# Patient Record
Sex: MALE | Race: BLACK OR AFRICAN AMERICAN | Employment: UNEMPLOYED | URBAN - METROPOLITAN AREA
[De-identification: names, ages, dates, MRNs, and addresses within clinical notes are randomized per-mention and may not be internally consistent; named-entity substitution may affect disease eponyms.]

---

## 2020-08-13 ENCOUNTER — VIRTUAL VISIT (OUTPATIENT)
Dept: INTERNAL MEDICINE CLINIC | Age: 60
End: 2020-08-13
Payer: MEDICAID

## 2020-08-13 DIAGNOSIS — R21 SKIN RASH: Primary | ICD-10-CM

## 2020-08-13 DIAGNOSIS — Z11.3 SCREEN FOR STD (SEXUALLY TRANSMITTED DISEASE): ICD-10-CM

## 2020-08-13 PROCEDURE — 99213 OFFICE O/P EST LOW 20 MIN: CPT | Performed by: INTERNAL MEDICINE

## 2020-08-13 NOTE — PROGRESS NOTES
Chief Complaint   Patient presents with    Rash     really wanted a physical     1. Have you been to the ER, urgent care clinic since your last visit? Hospitalized since your last visit? No    2. Have you seen or consulted any other health care providers outside of the [address] [place] [address] since your last visit? Include any pap smears or colon screening.  No

## 2020-08-13 NOTE — PROGRESS NOTES
Chrissie Glass is a 63 y.o. male who was seen by synchronous (real-time) audio-video technology on 8/13/2020 for Rash (really wanted a physical)        Assessment & Plan:   1. Skin rash  Resolving w/o intervention  - METABOLIC PANEL, COMPREHENSIVE  - LIPID PANEL  - PSA, DIAGNOSTIC (PROSTATE SPECIFIC AG)  - CBC W/O DIFF    2. Screen for STD (sexually transmitted disease)    - HEPATITIS C QT BY PCR WITH REFLEX GENOTYPE  - HIV 1/2 AG/AB, 4TH GENERATION,W RFLX CONFIRM  - CHLAMYDIA/GC PCR  - T VAGINALIS AMPLIFICATION  - T PALLIDUM SCREEN W/REFLEX    Pt will schedule an appt for IN OFFICE establish care visit    712  Subjective:     First visit. Pt w c/o 1 week non-itchy rash on chest and jenna arms. ?Insect bites. Pt relays the rash is now resolved (almost) and is no longer bothersome. Pt also suffered from abd cramps and loose BMs after eating out  About the same time, but has recovered from that as well w nml BMs. Pt denies fever/URI sxs/blood in stool/intractable vomiting. Prior to Admission medications    Not on File     Review of systems (12) negative, except noted above. Objective:     Patient-Reported Vitals 8/13/2020   Patient-Reported Weight 193lbs   Patient-Reported Height 5f11      General: alert, cooperative, no distress   Mental  status: normal mood, behavior, speech, dress, motor activity, and thought processes, able to follow commands   HENT: NCAT   Neck: no visualized mass   Resp: no respiratory distress   Neuro: no gross deficits   Skin: no discoloration or lesions of concern on visible areas. Unable to visualize rash due to poor camera quality   Psychiatric: normal affect, consistent with stated mood, no evidence of hallucinations           We discussed the expected course, resolution and complications of the diagnosis(es) in detail. Medication risks, benefits, costs, interactions, and alternatives were discussed as indicated.   I advised him to contact the office if his condition worsens, changes or fails to improve as anticipated. He expressed understanding with the diagnosis(es) and plan. Bronwyn Goldstein, who was evaluated through a patient-initiated, synchronous (real-time) audio-video encounter, and/or his healthcare decision maker, is aware that it is a billable service, with coverage as determined by his insurance carrier. He provided verbal consent to proceed: Yes, and patient identification was verified. It was conducted pursuant to the emergency declaration under the [address] [place] [address], [address] [place] [address] authority and the [email or web address] and [employer] General Act. A caregiver was present when appropriate. Ability to conduct physical exam was limited. I was at home. The patient was at home.       Nichol Catherine MD

## 2020-08-19 LAB
ALBUMIN SERPL-MCNC: 3.6 G/DL (ref 3.8–4.8)
ALBUMIN/GLOB SERPL: 0.9 {RATIO} (ref 1.2–2.2)
ALP SERPL-CCNC: 146 IU/L (ref 39–117)
ALT SERPL-CCNC: 22 IU/L (ref 0–44)
AST SERPL-CCNC: 17 IU/L (ref 0–40)
BILIRUB SERPL-MCNC: 0.7 MG/DL (ref 0–1.2)
BUN SERPL-MCNC: 15 MG/DL (ref 8–27)
BUN/CREAT SERPL: 16 (ref 10–24)
C TRACH RRNA SPEC QL NAA+PROBE: NEGATIVE
CALCIUM SERPL-MCNC: 9.4 MG/DL (ref 8.6–10.2)
CHLORIDE SERPL-SCNC: 103 MMOL/L (ref 96–106)
CHOLEST SERPL-MCNC: 170 MG/DL (ref 100–199)
CO2 SERPL-SCNC: 26 MMOL/L (ref 20–29)
CREAT SERPL-MCNC: 0.91 MG/DL (ref 0.76–1.27)
ERYTHROCYTE [DISTWIDTH] IN BLOOD BY AUTOMATED COUNT: 15.5 % (ref 11.6–15.4)
GLOBULIN SER CALC-MCNC: 4.2 G/DL (ref 1.5–4.5)
GLUCOSE SERPL-MCNC: 71 MG/DL (ref 65–99)
HCT VFR BLD AUTO: 39.3 % (ref 37.5–51)
HCV GENOTYPE: NORMAL
HCV RNA SERPL NAA+PROBE-ACNC: NORMAL IU/ML
HCV RNA SERPL NAA+PROBE-LOG IU: NORMAL LOG10 IU/ML
HDLC SERPL-MCNC: 33 MG/DL
HGB BLD-MCNC: 12.4 G/DL (ref 13–17.7)
HIV 1+2 AB+HIV1 P24 AG SERPL QL IA: NON REACTIVE
INTERPRETATION, 910389: NORMAL
LDLC SERPL CALC-MCNC: 112 MG/DL (ref 0–99)
MCH RBC QN AUTO: 28.6 PG (ref 26.6–33)
MCHC RBC AUTO-ENTMCNC: 31.6 G/DL (ref 31.5–35.7)
MCV RBC AUTO: 91 FL (ref 79–97)
N GONORRHOEA RRNA SPEC QL NAA+PROBE: NEGATIVE
PLATELET # BLD AUTO: 427 X10E3/UL (ref 150–450)
POTASSIUM SERPL-SCNC: 4.7 MMOL/L (ref 3.5–5.2)
PROT SERPL-MCNC: 7.8 G/DL (ref 6–8.5)
PSA SERPL-MCNC: 1.8 NG/ML (ref 0–4)
RBC # BLD AUTO: 4.34 X10E6/UL (ref 4.14–5.8)
RPR SER QL: REACTIVE
RPR SER-TITR: ABNORMAL {TITER}
SODIUM SERPL-SCNC: 143 MMOL/L (ref 134–144)
T VAGINALIS DNA SPEC QL NAA+PROBE: NEGATIVE
TEST INFORMATION: NORMAL
TREPONEMA PALLIDUM IGG+IGM AB [PRESENCE] IN SERUM OR PLASMA BY IMMUNOASSAY: REACTIVE
TRIGL SERPL-MCNC: 127 MG/DL (ref 0–149)
VLDLC SERPL CALC-MCNC: 25 MG/DL (ref 5–40)
WBC # BLD AUTO: 7.8 X10E3/UL (ref 3.4–10.8)

## 2020-08-20 ENCOUNTER — TELEPHONE (OUTPATIENT)
Dept: INTERNAL MEDICINE CLINIC | Age: 60
End: 2020-08-20

## 2020-08-20 PROBLEM — A53.0 POSITIVE RPR TEST: Status: ACTIVE | Noted: 2020-08-20

## 2020-08-20 NOTE — TELEPHONE ENCOUNTER
Called and d/w pt + syphilis test. [facility] number provided for tx and counseling re:partner notification. D/w pt NO UNPROTECTED sex.

## 2020-10-07 ENCOUNTER — OFFICE VISIT (OUTPATIENT)
Dept: INTERNAL MEDICINE CLINIC | Age: 60
End: 2020-10-07
Payer: MEDICAID

## 2020-10-07 VITALS
SYSTOLIC BLOOD PRESSURE: 138 MMHG | WEIGHT: 194.8 LBS | OXYGEN SATURATION: 96 % | HEIGHT: 71 IN | RESPIRATION RATE: 19 BRPM | HEART RATE: 88 BPM | BODY MASS INDEX: 27.27 KG/M2 | DIASTOLIC BLOOD PRESSURE: 91 MMHG | TEMPERATURE: 98.4 F

## 2020-10-07 DIAGNOSIS — I10 ESSENTIAL HYPERTENSION: Primary | ICD-10-CM

## 2020-10-07 DIAGNOSIS — E78.5 HYPERLIPIDEMIA, UNSPECIFIED HYPERLIPIDEMIA TYPE: ICD-10-CM

## 2020-10-07 DIAGNOSIS — G89.29 CHRONIC LEFT-SIDED LOW BACK PAIN WITH LEFT-SIDED SCIATICA: ICD-10-CM

## 2020-10-07 DIAGNOSIS — Z23 ENCOUNTER FOR IMMUNIZATION: ICD-10-CM

## 2020-10-07 DIAGNOSIS — M54.42 CHRONIC LEFT-SIDED LOW BACK PAIN WITH LEFT-SIDED SCIATICA: ICD-10-CM

## 2020-10-07 PROCEDURE — 90715 TDAP VACCINE 7 YRS/> IM: CPT

## 2020-10-07 PROCEDURE — 99214 OFFICE O/P EST MOD 30 MIN: CPT | Performed by: INTERNAL MEDICINE

## 2020-10-07 RX ORDER — ATORVASTATIN CALCIUM 10 MG/1
10 TABLET, FILM COATED ORAL DAILY
COMMUNITY
End: 2021-09-21 | Stop reason: SDUPTHER

## 2020-10-07 RX ORDER — LISINOPRIL 10 MG/1
10 TABLET ORAL DAILY
Qty: 90 TAB | Refills: 1 | Status: SHIPPED | OUTPATIENT
Start: 2020-10-07 | End: 2021-09-21 | Stop reason: SDUPTHER

## 2020-10-07 RX ORDER — LISINOPRIL 5 MG/1
5 TABLET ORAL DAILY
COMMUNITY
End: 2020-10-07 | Stop reason: SDUPTHER

## 2020-10-07 NOTE — PROGRESS NOTES
Kasia Hernandez is a 63 y.o. male and presents with Hypertension  . Subjective:  Pt comes-in for PE    PMH- HTN- on lisinopril 5 mg     BP Readings from Last 3 Encounters:   10/07/20 (!) 138/91      Hyperlipidemia- on atorvastatin 10mg  Lab Results   Component Value Date/Time    Cholesterol, total 170 2020 08:57 AM    HDL Cholesterol 33 (L) 2020 08:57 AM    LDL, calculated 112 (H) 2020 08:57 AM    VLDL, calculated 25 2020 08:57 AM    Triglyceride 127 2020 08:57 AM     Lab Results   Component Value Date/Time    ALT (SGPT) 22 2020 08:57 AM    AST (SGOT) 17 2020 08:57 AM    Alk. phosphatase 146 (H) 2020 08:57 AM    Bilirubin, total 0.7 2020 08:57 AM      Chronic lbp/OA      PSH-none    SH- single   Self employed   No tob, occas alcohol, no illicit drugs   Lives alone no children    FH- father  ? ? Mother  CAD, HTN   1 brother HTN    HM    Colonoscopy ~ 5 yrs ago in [facility] as per pt  Immunizations declines flu vaccine  Eye care ~6 mths  Dental care ~ 1 mth ago          Review of Systems  Constitutional: negative for fevers, chills, anorexia and weight loss  Eyes:   negative for visual disturbance and irritation  ENT:   negative for tinnitus,sore throat,nasal congestion,ear pains. hoarseness  Respiratory:  negative for cough, hemoptysis, dyspnea,wheezing  CV:   negative for chest pain, palpitations, lower extremity edema  GI:   negative for nausea, vomiting, diarrhea, abdominal pain,melena  Endo:               negative for polyuria,polydipsia,polyphagia,heat intolerance  Musculoskel: positive for  arthralgias, back pain,  joint pain  Neurological:  negative for headaches, dizziness, vertigo, memory problems and gait   Behavl/Psych: negative for feelings of anxiety, depression, mood changes    Past Medical History:   Diagnosis Date    Hypercholesterolemia     Hypertension      History reviewed. No pertinent surgical history.   Social History Socioeconomic History    Marital status: UNKNOWN     Spouse name: Not on file    Number of children: Not on file    Years of education: Not on file    Highest education level: Not on file   Tobacco Use    Smoking status: Former Smoker     Packs/day: 0.25     Years: 2.00     Pack years: 0.50     Last attempt to quit: 2000     Years since quittin.1    Smokeless tobacco: Never Used   Substance and Sexual Activity    Alcohol use: Yes     Comment: social drinker    Drug use: Never    Sexual activity: Yes     Partners: Female     Birth control/protection: Condom     History reviewed. No pertinent family history. Current Outpatient Medications   Medication Sig Dispense Refill    atorvastatin (LIPITOR) 10 mg tablet Take 10 mg by mouth daily.  lisinopriL (PRINIVIL, ZESTRIL) 10 mg tablet Take 1 Tab by mouth daily. 90 Tab 1     No Known Allergies    Objective:  Visit Vitals  BP (!) 138/91 (BP 1 Location: Left arm, BP Patient Position: Sitting)   Pulse 88   Temp 98.4 °F (36.9 °C) (Temporal)   Resp 19   Ht 5' 11\" (1.803 m)   Wt 194 lb 12.8 oz (88.4 kg)   SpO2 96%   BMI 27.17 kg/m²     Physical Exam:   General appearance - alert, well appearing, and in no distress. Pleasant  Mental status - alert, oriented to person, place, and time  EYE-EOMI  Neck - supple, no significant adenopathy   Chest - clear to auscultation, no wheezes, rales or rhonchi, symmetric air entry   Heart - normal rate, regular rhythm, normal S1, S2, no murmurs, rubs, clicks or gallops   Abdomen - soft, nontender, nondistended, no masses or organomegaly + small, reducible umbil hernia  Ext-peripheral pulses normal, no pedal edema, no clubbing or cyanosis  Skin-Warm and dry.  no hyperpigmentation, vitiligo, or suspicious lesions  Neuro -alert, oriented, normal speech, no focal findings or movement disorder noted      Results for orders placed or performed in visit on 20   CHLAMYDIA/GC PCR   Result Value Ref Range    Chlamydia trachomatis, SAMANTHA Negative Negative    Neisseria gonorrhoeae, SAMANTHA Negative Negative   METABOLIC PANEL, COMPREHENSIVE   Result Value Ref Range    Glucose 71 65 - 99 mg/dL    BUN 15 8 - 27 mg/dL    Creatinine 0.91 0.76 - 1.27 mg/dL    GFR est non-AA 91 >59 mL/min/1.73    GFR est  >59 mL/min/1.73    BUN/Creatinine ratio 16 10 - 24    Sodium 143 134 - 144 mmol/L    Potassium 4.7 3.5 - 5.2 mmol/L    Chloride 103 96 - 106 mmol/L    CO2 26 20 - 29 mmol/L    Calcium 9.4 8.6 - 10.2 mg/dL    Protein, total 7.8 6.0 - 8.5 g/dL    Albumin 3.6 (L) 3.8 - 4.8 g/dL    GLOBULIN, TOTAL 4.2 1.5 - 4.5 g/dL    A-G Ratio 0.9 (L) 1.2 - 2.2    Bilirubin, total 0.7 0.0 - 1.2 mg/dL    Alk.  phosphatase 146 (H) 39 - 117 IU/L    AST (SGOT) 17 0 - 40 IU/L    ALT (SGPT) 22 0 - 44 IU/L   LIPID PANEL   Result Value Ref Range    Cholesterol, total 170 100 - 199 mg/dL    Triglyceride 127 0 - 149 mg/dL    HDL Cholesterol 33 (L) >39 mg/dL    VLDL, calculated 25 5 - 40 mg/dL    LDL, calculated 112 (H) 0 - 99 mg/dL   PSA, DIAGNOSTIC (PROSTATE SPECIFIC AG)   Result Value Ref Range    Prostate Specific Ag 1.8 0.0 - 4.0 ng/mL   CBC W/O DIFF   Result Value Ref Range    WBC 7.8 3.4 - 10.8 x10E3/uL    RBC 4.34 4.14 - 5.80 x10E6/uL    HGB 12.4 (L) 13.0 - 17.7 g/dL    HCT 39.3 37.5 - 51.0 %    MCV 91 79 - 97 fL    MCH 28.6 26.6 - 33.0 pg    MCHC 31.6 31.5 - 35.7 g/dL    RDW 15.5 (H) 11.6 - 15.4 %    PLATELET 507 674 - 230 x10E3/uL   HEPATITIS C QT BY PCR WITH REFLEX GENOTYPE   Result Value Ref Range    Hepatitis C Quantitation HCV Not Detected IU/mL    HCV log10 CANCELED log10 IU/mL    TEST INFORMATION Comment     HCV Genotype CANCELED    HIV 1/2 AG/AB, 4TH GENERATION,W RFLX CONFIRM   Result Value Ref Range    HIV SCREEN 4TH GENERATION WRFX Non Reactive Non Reactive   T VAGINALIS AMPLIFICATION   Result Value Ref Range    T. vaginalis by SAAMNTHA Negative Negative   T PALLIDUM SCREEN W/REFLEX   Result Value Ref Range    T PALLIDUM AB Reactive (A) Non Reactive RPR   Result Value Ref Range    RPR Reactive (A) Non Reactive   RPR, QUANT   Result Value Ref Range    RPR, QUANT 1:128 (H) NonRea<1:1   CVD REPORT   Result Value Ref Range    INTERPRETATION Note        Assessment/Plan:    ICD-10-CM ICD-9-CM    1. Essential hypertension  I10 401.9 lisinopriL (PRINIVIL, ZESTRIL) 10 mg tablet   2. Hyperlipidemia, unspecified hyperlipidemia type  E78.5 272.4    3. Chronic left-sided low back pain with left-sided sciatica  M54.42 724.2 XR SPINE LUMB 2 OR 3 V    G89.29 724.3 XR HIP RT W OR WO PELV 2-3 VWS     338.29 XR HIP LT W OR WO PELV 2-3 VWS   4. Encounter for immunization  Z23 V03.89 TETANUS, DIPHTHERIA TOXOIDS AND ACELLULAR PERTUSSIS VACCINE (TDAP), IN INDIVIDS. >=7, IM     Orders Placed This Encounter    XR SPINE LUMB 2 OR 3 V     Standing Status:   Future     Standing Expiration Date:   1/7/2021    XR HIP RT W OR WO PELV 2-3 VWS     Standing Status:   Future     Standing Expiration Date:   1/7/2021    XR HIP LT W OR WO PELV 2-3 VWS     Standing Status:   Future     Standing Expiration Date:   1/7/2021    TETANUS, DIPHTHERIA TOXOIDS AND ACELLULAR PERTUSSIS VACCINE (TDAP), IN INDIVIDS. >=7, IM    atorvastatin (LIPITOR) 10 mg tablet     Sig: Take 10 mg by mouth daily.  DISCONTD: lisinopriL (PRINIVIL, ZESTRIL) 5 mg tablet     Sig: Take 5 mg by mouth daily.  lisinopriL (PRINIVIL, ZESTRIL) 10 mg tablet     Sig: Take 1 Tab by mouth daily. Dispense:  90 Tab     Refill:  1     HIGHER DOSE     1. Essential hypertension  Increase lisinopril 10mg daily  - lisinopriL (PRINIVIL, ZESTRIL) 10 mg tablet; Take 1 Tab by mouth daily. Dispense: 90 Tab; Refill: 1    2. Hyperlipidemia, unspecified hyperlipidemia type  Cont statin    3. Chronic left-sided low back pain with left-sided sciatica    - XR SPINE LUMB 2 OR 3 V; Future  - XR HIP RT W OR WO PELV 2-3 VWS; Future  - XR HIP LT W OR WO PELV 2-3 VWS; Future    4.  Encounter for immunization  Administered  - TETANUS, DIPHTHERIA TOXOIDS AND ACELLULAR PERTUSSIS VACCINE (TDAP), IN INDIVIDS. >=7, IM    There are no Patient Instructions on file for this visit. I have reviewed with the patient details of the assessment and plan and all questions were answered. Relevent patient education was performed. The most recent lab findings were reviewed with the patient. An After Visit Summary was printed and given to the patient.

## 2020-10-07 NOTE — PROGRESS NOTES
Chief Complaint   Patient presents with    Hypertension     1. Have you been to the ER, urgent care clinic since your last visit? Hospitalized since your last visit? No    2. Have you seen or consulted any other health care providers outside of the [address] [place] [address] since your last visit? Include any pap smears or colon screening. No     After obtaining consent, and per orders of Dr. Franklin Kelly, injection of TDAPgiven by Sourav Campos LPN. Patient instructed to remain in clinic for 15 minutes afterwards, and to report any adverse reaction to me immediately.

## 2021-09-21 ENCOUNTER — OFFICE VISIT (OUTPATIENT)
Dept: INTERNAL MEDICINE CLINIC | Age: 61
End: 2021-09-21
Payer: MEDICAID

## 2021-09-21 ENCOUNTER — HOSPITAL ENCOUNTER (OUTPATIENT)
Dept: GENERAL RADIOLOGY | Age: 61
Discharge: HOME | End: 2021-09-21
Payer: MEDICAID

## 2021-09-21 VITALS
OXYGEN SATURATION: 96 % | TEMPERATURE: 98.2 F | WEIGHT: 198 LBS | HEART RATE: 88 BPM | DIASTOLIC BLOOD PRESSURE: 89 MMHG | HEIGHT: 71 IN | RESPIRATION RATE: 19 BRPM | SYSTOLIC BLOOD PRESSURE: 137 MMHG | BODY MASS INDEX: 27.72 KG/M2

## 2021-09-21 DIAGNOSIS — R09.89 BIBASILAR CRACKLES: ICD-10-CM

## 2021-09-21 DIAGNOSIS — E78.5 HYPERLIPIDEMIA, UNSPECIFIED HYPERLIPIDEMIA TYPE: ICD-10-CM

## 2021-09-21 DIAGNOSIS — Z11.3 SCREEN FOR STD (SEXUALLY TRANSMITTED DISEASE): ICD-10-CM

## 2021-09-21 DIAGNOSIS — I10 ESSENTIAL HYPERTENSION: Primary | ICD-10-CM

## 2021-09-21 PROCEDURE — 99214 OFFICE O/P EST MOD 30 MIN: CPT | Performed by: INTERNAL MEDICINE

## 2021-09-21 PROCEDURE — 71046 X-RAY EXAM CHEST 2 VIEWS: CPT

## 2021-09-21 RX ORDER — LISINOPRIL 10 MG/1
10 TABLET ORAL DAILY
Qty: 90 TABLET | Refills: 1 | Status: SHIPPED | OUTPATIENT
Start: 2021-09-21 | End: 2022-03-29 | Stop reason: SDUPTHER

## 2021-09-21 RX ORDER — ATORVASTATIN CALCIUM 10 MG/1
10 TABLET, FILM COATED ORAL DAILY
Qty: 90 TABLET | Refills: 1 | Status: SHIPPED | OUTPATIENT
Start: 2021-09-21 | End: 2022-03-29 | Stop reason: SDUPTHER

## 2021-09-21 NOTE — PROGRESS NOTES
Chief Complaint   Patient presents with    Blood Pressure Check     1. Have you been to the ER, urgent care clinic since your last visit? Hospitalized since your last visit? No    2. Have you seen or consulted any other health care providers outside of the [address] [place] [address] since your last visit? Include any pap smears or colon screening.  No

## 2021-09-21 NOTE — PROGRESS NOTES
Alexis Pearson is a 57 y.o. male and presents with Blood Pressure Check  . Subjective:  Pts last visit 10/020    PMH- HTN- on lisinopril 5 mg     BP Readings from Last 3 Encounters:   21 137/89   10/07/20 (!) 138/91      Hyperlipidemia- on atorvastatin 10mg  Lab Results   Component Value Date/Time    Cholesterol, total 170 2020 08:57 AM    HDL Cholesterol 33 (L) 2020 08:57 AM    LDL, calculated 112 (H) 2020 08:57 AM    VLDL, calculated 25 2020 08:57 AM    Triglyceride 127 2020 08:57 AM     Lab Results   Component Value Date/Time    ALT (SGPT) 22 2020 08:57 AM    AST (SGOT) 17 2020 08:57 AM    Alk. phosphatase 146 (H) 2020 08:57 AM    Bilirubin, total 0.7 2020 08:57 AM      Chronic lbp/OA      PSH-none    SH- single   Self employed   No tob, occas alcohol, no illicit drugs   Lives alone no children    FH- father  ? ? Mother  CAD, HTN   1 brother HTN    HM    Colonoscopy ~ 5 yrs ago in [facility] as per pt  Immunizations declines flu vaccine  Eye care ~6 mths  Dental care ~ 1 mth ago          Review of Systems  Constitutional: negative for fevers, chills, anorexia and weight loss  Eyes:   negative for visual disturbance and irritation  ENT:   negative for tinnitus,sore throat,nasal congestion,ear pains. hoarseness  Respiratory:  negative for cough, hemoptysis, dyspnea,wheezing  CV:   negative for chest pain, palpitations, lower extremity edema  GI:   negative for nausea, vomiting, diarrhea, abdominal pain,melena  Endo:               negative for polyuria,polydipsia,polyphagia,heat intolerance  Musculoskel: positive for  arthralgias, back pain,  joint pain  Neurological:  negative for headaches, dizziness, vertigo, memory problems and gait   Behavl/Psych: negative for feelings of anxiety, depression, mood changes    Past Medical History:   Diagnosis Date    Hypercholesterolemia     Hypertension      History reviewed.  No pertinent surgical history. Social History     Socioeconomic History    Marital status: UNKNOWN     Spouse name: Not on file    Number of children: Not on file    Years of education: Not on file    Highest education level: Not on file   Tobacco Use    Smoking status: Former Smoker     Packs/day: 0.25     Years: 2.00     Pack years: 0.50     Quit date: 2000     Years since quittin.1    Smokeless tobacco: Never Used   Substance and Sexual Activity    Alcohol use: Yes     Comment: social drinker    Drug use: Never    Sexual activity: Yes     Partners: Female     Birth control/protection: Condom     Social Determinants of Health     Financial Resource Strain:     Difficulty of Paying Living Expenses:    Food Insecurity:     Worried About Running Out of Food in the Last Year:     [address] in the Last Year:    Transportation Needs:     Lack of Transportation (Medical):  Lack of Transportation (Non-Medical):    Physical Activity:     Days of Exercise per Week:     Minutes of Exercise per Session:    Stress:     Feeling of Stress :    Social Connections:     Frequency of Communication with Friends and Family:     Frequency of Social Gatherings with Friends and Family:     Attends Rastafari Services:     Active Member of Clubs or Organizations:     Attends Club or Organization Meetings:     Marital Status:      No family history on file. Current Outpatient Medications   Medication Sig Dispense Refill    lisinopriL (PRINIVIL, ZESTRIL) 10 mg tablet Take 1 Tablet by mouth daily. 90 Tablet 1    atorvastatin (LIPITOR) 10 mg tablet Take 1 Tablet by mouth daily.  90 Tablet 1     No Known Allergies    Objective:  Visit Vitals  /89 (BP 1 Location: Left upper arm, BP Patient Position: Sitting, BP Cuff Size: Adult)   Pulse 88   Temp 98.2 °F (36.8 °C) (Temporal)   Resp 19   Ht 5' 11\" (1.803 m)   Wt 198 lb (89.8 kg)   SpO2 96%   BMI 27.62 kg/m²     Physical Exam:   General appearance - alert, well appearing, and in no distress. Pleasant  Mental status - alert, oriented to person, place, and time  EYE-EOMI  Neck - supple, no significant adenopathy   Chest - clear to auscultation, no wheezes, rales or rhonchi, symmetric air entry +bibasilar rales Does NOT clear w cough  Heart - normal rate, regular rhythm, normal S1, S2, no murmurs, rubs, clicks or gallops   Abdomen - soft, nontender, nondistended, no masses or organomegaly + small, reducible umbil hernia  Ext-peripheral pulses normal, no pedal edema, no clubbing or cyanosis  Skin-Warm and dry. no hyperpigmentation, vitiligo, or suspicious lesions  Neuro -alert, oriented, normal speech, no focal findings or movement disorder noted      Results for orders placed or performed in visit on 08/13/20   CHLAMYDIA/GC PCR   Result Value Ref Range    Chlamydia trachomatis, SAMANTHA Negative Negative    Neisseria gonorrhoeae, SAMANTHA Negative Negative   METABOLIC PANEL, COMPREHENSIVE   Result Value Ref Range    Glucose 71 65 - 99 mg/dL    BUN 15 8 - 27 mg/dL    Creatinine 0.91 0.76 - 1.27 mg/dL    GFR est non-AA 91 >59 mL/min/1.73    GFR est  >59 mL/min/1.73    BUN/Creatinine ratio 16 10 - 24    Sodium 143 134 - 144 mmol/L    Potassium 4.7 3.5 - 5.2 mmol/L    Chloride 103 96 - 106 mmol/L    CO2 26 20 - 29 mmol/L    Calcium 9.4 8.6 - 10.2 mg/dL    Protein, total 7.8 6.0 - 8.5 g/dL    Albumin 3.6 (L) 3.8 - 4.8 g/dL    GLOBULIN, TOTAL 4.2 1.5 - 4.5 g/dL    A-G Ratio 0.9 (L) 1.2 - 2.2    Bilirubin, total 0.7 0.0 - 1.2 mg/dL    Alk.  phosphatase 146 (H) 39 - 117 IU/L    AST (SGOT) 17 0 - 40 IU/L    ALT (SGPT) 22 0 - 44 IU/L   LIPID PANEL   Result Value Ref Range    Cholesterol, total 170 100 - 199 mg/dL    Triglyceride 127 0 - 149 mg/dL    HDL Cholesterol 33 (L) >39 mg/dL    VLDL, calculated 25 5 - 40 mg/dL    LDL, calculated 112 (H) 0 - 99 mg/dL   PSA, DIAGNOSTIC (PROSTATE SPECIFIC AG)   Result Value Ref Range    Prostate Specific Ag 1.8 0.0 - 4.0 ng/mL   CBC W/O DIFF   Result Value Ref Range    WBC 7.8 3.4 - 10.8 x10E3/uL    RBC 4.34 4.14 - 5.80 x10E6/uL    HGB 12.4 (L) 13.0 - 17.7 g/dL    HCT 39.3 37.5 - 51.0 %    MCV 91 79 - 97 fL    MCH 28.6 26.6 - 33.0 pg    MCHC 31.6 31.5 - 35.7 g/dL    RDW 15.5 (H) 11.6 - 15.4 %    PLATELET 071 654 - 162 x10E3/uL   HEPATITIS C QT BY PCR WITH REFLEX GENOTYPE   Result Value Ref Range    Hepatitis C Quantitation HCV Not Detected IU/mL    HCV log10 CANCELED log10 IU/mL    Test information Comment     HCV Genotype CANCELED    HIV 1/2 AG/AB, 4TH GENERATION,W RFLX CONFIRM   Result Value Ref Range    HIV SCREEN 4TH GENERATION WRFX Non Reactive Non Reactive   T VAGINALIS AMPLIFICATION   Result Value Ref Range    T. vaginalis by SAMANTHA Negative Negative   T PALLIDUM SCREEN W/REFLEX   Result Value Ref Range    T PALLIDUM AB Reactive (A) Non Reactive   RPR   Result Value Ref Range    RPR Reactive (A) Non Reactive   RPR, QUANT   Result Value Ref Range    RPR, QUANT 1:128 (H) NonRea<1:1   CVD REPORT   Result Value Ref Range    INTERPRETATION Note        Assessment/Plan:    ICD-10-CM ICD-9-CM    1. Essential hypertension  I10 401.9 lisinopriL (PRINIVIL, ZESTRIL) 10 mg tablet      PSA, DIAGNOSTIC (PROSTATE SPECIFIC AG)      LIPID PANEL      CBC WITH AUTOMATED DIFF      METABOLIC PANEL, COMPREHENSIVE      CANCELED: PSA, DIAGNOSTIC (PROSTATE SPECIFIC AG)      CANCELED: LIPID PANEL      CANCELED: CBC WITH AUTOMATED DIFF      CANCELED: METABOLIC PANEL, COMPREHENSIVE      CANCELED: METABOLIC PANEL, COMPREHENSIVE      CANCELED: CBC WITH AUTOMATED DIFF      CANCELED: LIPID PANEL      CANCELED: PSA, DIAGNOSTIC (PROSTATE SPECIFIC AG)   2.  Hyperlipidemia, unspecified hyperlipidemia type  E78.5 272.4 atorvastatin (LIPITOR) 10 mg tablet      PSA, DIAGNOSTIC (PROSTATE SPECIFIC AG)      LIPID PANEL      CBC WITH AUTOMATED DIFF      METABOLIC PANEL, COMPREHENSIVE      CANCELED: PSA, DIAGNOSTIC (PROSTATE SPECIFIC AG)      CANCELED: LIPID PANEL      CANCELED: CBC WITH AUTOMATED DIFF CANCELED: METABOLIC PANEL, COMPREHENSIVE      CANCELED: METABOLIC PANEL, COMPREHENSIVE      CANCELED: CBC WITH AUTOMATED DIFF      CANCELED: LIPID PANEL      CANCELED: PSA, DIAGNOSTIC (PROSTATE SPECIFIC AG)   3. Screen for STD (sexually transmitted disease)  Z11.3 V74.5 HIV 1/2 AG/AB, 4TH GENERATION,W RFLX CONFIRM      T VAGINALIS AMPLIFICATION      CHLAMYDIA / GC-AMPLIFIED      HEPATITIS C AB, RFLX TO QT BY PCR      CANCELED: HIV 1/2 AG/AB, 4TH GENERATION,W RFLX CONFIRM      CANCELED: T VAGINALIS AMPLIFICATION      CANCELED: CHLAMYDIA / GC-AMPLIFIED      CANCELED: HEPATITIS C AB, RFLX TO QT BY PCR      CANCELED: HEPATITIS C AB, RFLX TO QT BY PCR      CANCELED: CHLAMYDIA / GC-AMPLIFIED      CANCELED: T VAGINALIS AMPLIFICATION      CANCELED: HIV 1/2 AG/AB, 4TH GENERATION,W RFLX CONFIRM   4. Bibasilar crackles  R09.89 786.7 XR CHEST PA LAT     Orders Placed This Encounter    XR CHEST PA LAT     Standing Status:   Future     Number of Occurrences:   1     Standing Expiration Date:   12/21/2021    PROSTATE SPECIFIC AG     Standing Status:   Future     Number of Occurrences:   1     Standing Expiration Date:   9/21/2022    LIPID PANEL     Standing Status:   Future     Number of Occurrences:   1     Standing Expiration Date:   9/21/2022    CBC WITH AUTOMATED DIFF     Standing Status:   Future     Number of Occurrences:   1     Standing Expiration Date:   7/87/9364    METABOLIC PANEL, COMPREHENSIVE     Standing Status:   Future     Number of Occurrences:   1     Standing Expiration Date:   9/21/2022    HIV 1/2 AG/AB, 4TH GENERATION,W RFLX CONFIRM     Standing Status:   Future     Number of Occurrences:   1     Standing Expiration Date:   9/21/2022    T VAGINALIS AMPLIFICATION     Standing Status:   Future     Number of Occurrences:   1     Standing Expiration Date:   9/21/2022    CHLAMYDIA / GC-AMPLIFIED     Standing Status:   Future     Number of Occurrences:   1     Standing Expiration Date:   9/21/2022   Rukhsana Chirinos HEPATITIS C AB, RFLX TO QT BY PCR     Standing Status:   Future     Number of Occurrences:   1     Standing Expiration Date:   9/21/2022    lisinopriL (PRINIVIL, ZESTRIL) 10 mg tablet     Sig: Take 1 Tablet by mouth daily. Dispense:  90 Tablet     Refill:  1    atorvastatin (LIPITOR) 10 mg tablet     Sig: Take 1 Tablet by mouth daily. Dispense:  90 Tablet     Refill:  1     1. Essential hypertension  Cont ACE-I  - lisinopriL (PRINIVIL, ZESTRIL) 10 mg tablet; Take 1 Tablet by mouth daily. Dispense: 90 Tablet; Refill: 1  - PSA, DIAGNOSTIC (PROSTATE SPECIFIC AG); Future  - LIPID PANEL; Future  - CBC WITH AUTOMATED DIFF; Future  - METABOLIC PANEL, COMPREHENSIVE; Future    2. Hyperlipidemia, unspecified hyperlipidemia type  Cont statin  - atorvastatin (LIPITOR) 10 mg tablet; Take 1 Tablet by mouth daily. Dispense: 90 Tablet; Refill: 1  - PSA, DIAGNOSTIC (PROSTATE SPECIFIC AG); Future  - LIPID PANEL; Future  - CBC WITH AUTOMATED DIFF; Future  - METABOLIC PANEL, COMPREHENSIVE; Future    3. Screen for STD (sexually transmitted disease)    - HIV 1/2 AG/AB, 4TH GENERATION,W RFLX CONFIRM; Future  - T VAGINALIS AMPLIFICATION; Future  - Ruthann Certain / GC-AMPLIFIED; Future  - HEPATITIS C AB, RFLX TO QT BY PCR; Future    4. Bibasilar crackles    - XR CHEST PA LAT; Future      There are no Patient Instructions on file for this visit. Follow-up and Dispositions    · Return in about 6 months (around 3/21/2022). I have reviewed with the patient details of the assessment and plan and all questions were answered. Relevent patient education was performed. The most recent lab findings were reviewed with the patient. An After Visit Summary was printed and given to the patient.

## 2021-09-22 ENCOUNTER — TELEPHONE (OUTPATIENT)
Dept: INTERNAL MEDICINE CLINIC | Age: 61
End: 2021-09-22

## 2021-09-23 DIAGNOSIS — J84.10 INTERSTITIAL PULMONARY FIBROSIS (HCC): Primary | ICD-10-CM

## 2022-03-18 PROBLEM — A53.0 POSITIVE RPR TEST: Status: ACTIVE | Noted: 2020-08-20

## 2022-03-29 ENCOUNTER — OFFICE VISIT (OUTPATIENT)
Dept: INTERNAL MEDICINE CLINIC | Age: 62
End: 2022-03-29
Payer: MEDICAID

## 2022-03-29 VITALS
DIASTOLIC BLOOD PRESSURE: 87 MMHG | TEMPERATURE: 98.3 F | HEIGHT: 71 IN | WEIGHT: 206.3 LBS | RESPIRATION RATE: 19 BRPM | HEART RATE: 98 BPM | BODY MASS INDEX: 28.88 KG/M2 | OXYGEN SATURATION: 92 % | SYSTOLIC BLOOD PRESSURE: 150 MMHG

## 2022-03-29 DIAGNOSIS — R09.89 BIBASILAR CRACKLES: ICD-10-CM

## 2022-03-29 DIAGNOSIS — Z12.11 COLON CANCER SCREENING: ICD-10-CM

## 2022-03-29 DIAGNOSIS — E78.5 HYPERLIPIDEMIA, UNSPECIFIED HYPERLIPIDEMIA TYPE: ICD-10-CM

## 2022-03-29 DIAGNOSIS — I10 ESSENTIAL HYPERTENSION: Primary | ICD-10-CM

## 2022-03-29 PROCEDURE — 99214 OFFICE O/P EST MOD 30 MIN: CPT | Performed by: INTERNAL MEDICINE

## 2022-03-29 RX ORDER — ATORVASTATIN CALCIUM 10 MG/1
10 TABLET, FILM COATED ORAL DAILY
Qty: 90 TABLET | Refills: 1 | Status: SHIPPED | OUTPATIENT
Start: 2022-03-29 | End: 2022-04-13 | Stop reason: SDUPTHER

## 2022-03-29 RX ORDER — LISINOPRIL 10 MG/1
10 TABLET ORAL DAILY
Qty: 90 TABLET | Refills: 1 | Status: SHIPPED | OUTPATIENT
Start: 2022-03-29 | End: 2022-04-13 | Stop reason: SDUPTHER

## 2022-03-29 NOTE — PROGRESS NOTES
Chief Complaint   Patient presents with    Hypertension     1. Have you been to the ER, urgent care clinic since your last visit? Hospitalized since your last visit? No    2. Have you seen or consulted any other health care providers outside of the [address] [place] [address] since your last visit? Include any pap smears or colon screening.  No

## 2022-03-29 NOTE — PROGRESS NOTES
Bri Duval is a 57 y.o. male and presents with Hypertension  . Subjective:    Pt relays he has been w/o his meds x 3 weeks. Pt consulted w pulm re:bibasilar rales and XCR c/w pulm fibrosis. He relays he saw them twice and did not hear back again    PMH- HTN- on lisinopril 5 mg     BP Readings from Last 3 Encounters:   22 (!) 150/87   21 137/89   10/07/20 (!) 138/91      Hyperlipidemia- on atorvastatin 10mg  Lab Results   Component Value Date/Time    Cholesterol, total 200 (H) 2021 10:04 AM    HDL Cholesterol 63 2021 10:04 AM    LDL, calculated 114.4 (H) 2021 10:04 AM    VLDL, calculated 22.6 2021 10:04 AM    Triglyceride 113 2021 10:04 AM    CHOL/HDL Ratio 3.2 2021 10:04 AM     Lab Results   Component Value Date/Time    ALT (SGPT) 14 2021 10:04 AM    AST (SGOT) 12 (L) 2021 10:04 AM    Alk. phosphatase 52 2021 10:04 AM    Bilirubin, total 1.1 (H) 2021 10:04 AM      Chronic lbp/OA      PSH-none    SH- single   Self employed   No tob, occas alcohol, no illicit drugs   Lives alone no children    FH- father  ? ? Mother  CAD, HTN   1 brother HTN    HM    Colonoscopy ~ 5 yrs ago in [facility] as per pt  Immunizations declines flu vaccine  Eye care UTD  Dental care ~ 1 mth ago          Review of Systems  Constitutional: negative for fevers, chills, anorexia and weight loss  Eyes:   negative for visual disturbance and irritation  ENT:   negative for tinnitus,sore throat,nasal congestion,ear pains. hoarseness  Respiratory:  negative for cough, hemoptysis, dyspnea,wheezing  CV:   negative for chest pain, palpitations, lower extremity edema  GI:   negative for nausea, vomiting, diarrhea, abdominal pain,melena  Endo:               negative for polyuria,polydipsia,polyphagia,heat intolerance  Musculoskel: positive for  arthralgias, back pain,  joint pain  Neurological:  negative for headaches, dizziness, vertigo, memory problems and gait Behavl/Psych: negative for feelings of anxiety, depression, mood changes    Past Medical History:   Diagnosis Date    Hypercholesterolemia     Hypertension      History reviewed. No pertinent surgical history. Social History     Socioeconomic History    Marital status: UNKNOWN   Tobacco Use    Smoking status: Former Smoker     Packs/day: 0.25     Years: 2.00     Pack years: 0.50     Quit date: 2000     Years since quittin.6    Smokeless tobacco: Never Used   Vaping Use    Vaping Use: Never used   Substance and Sexual Activity    Alcohol use: Yes     Comment: social drinker    Drug use: Never    Sexual activity: Yes     Partners: Female     Birth control/protection: Condom     History reviewed. No pertinent family history. Current Outpatient Medications   Medication Sig Dispense Refill    lisinopriL (PRINIVIL, ZESTRIL) 10 mg tablet Take 1 Tablet by mouth daily. 90 Tablet 1    atorvastatin (LIPITOR) 10 mg tablet Take 1 Tablet by mouth daily. 90 Tablet 1     No Known Allergies    Objective:  Visit Vitals  BP (!) 150/87 (BP 1 Location: Left upper arm, BP Patient Position: Sitting, BP Cuff Size: Large adult)   Pulse 98   Temp 98.3 °F (36.8 °C) (Temporal)   Resp 19   Ht 5' 11\" (1.803 m)   Wt 206 lb 4.8 oz (93.6 kg)   SpO2 92%   BMI 28.77 kg/m²     Physical Exam:   General appearance - alert, well appearing, and in no distress. Pleasant  Mental status - alert, oriented to person, place, and time  EYE-EOMI  Neck - supple, no significant adenopathy   Chest - clear to auscultation, no wheezes, rales or rhonchi, symmetric air entry +bibasilar rales Does NOT clear w cough  Heart - normal rate, regular rhythm, normal S1, S2, no murmurs, rubs, clicks or gallops   Abdomen - soft, nontender, nondistended, no masses or organomegaly + small, reducible umbil hernia  Ext-peripheral pulses normal, no pedal edema, no clubbing or cyanosis  Skin-Warm and dry.  no hyperpigmentation, vitiligo, or suspicious lesions  Neuro -alert, oriented, normal speech, no focal findings or movement disorder noted      Results for orders placed or performed in visit on 09/21/21   CHLAMYDIA / GC-AMPLIFIED   Result Value Ref Range    Source URINE     Chlamydia trachomatis, SAMANTHA Negative Negative      Neisseria gonorrhoeae, SAMANTHA Negative Negative     T VAGINALIS AMPLIFICATION   Result Value Ref Range    Source URINE     T. vaginalis by SAMANTHA Negative Negative     HIV 1/2 AG/AB, 4TH GENERATION,W RFLX CONFIRM   Result Value Ref Range    HIV 1/2 Interpretation NONREACTIVE NONREACTIVE      HIV 1/2 result comment SEE NOTE     METABOLIC PANEL, COMPREHENSIVE   Result Value Ref Range    Sodium 141 136 - 145 mmol/L    Potassium 4.1 3.5 - 5.1 mmol/L    Chloride 109 (H) 97 - 108 mmol/L    CO2 29 21 - 32 mmol/L    Anion gap 3 (L) 5 - 15 mmol/L    Glucose 81 65 - 100 mg/dL    BUN 16 6 - 20 MG/DL    Creatinine 1.10 0.70 - 1.30 MG/DL    BUN/Creatinine ratio 15 12 - 20      GFR est AA >60 >60 ml/min/1.73m2    GFR est non-AA >60 >60 ml/min/1.73m2    Calcium 9.3 8.5 - 10.1 MG/DL    Bilirubin, total 1.1 (H) 0.2 - 1.0 MG/DL    ALT (SGPT) 14 12 - 78 U/L    AST (SGOT) 12 (L) 15 - 37 U/L    Alk. phosphatase 52 45 - 117 U/L    Protein, total 7.6 6.4 - 8.2 g/dL    Albumin 3.6 3.5 - 5.0 g/dL    Globulin 4.0 2.0 - 4.0 g/dL    A-G Ratio 0.9 (L) 1.1 - 2.2     CBC WITH AUTOMATED DIFF   Result Value Ref Range    WBC 7.8 4.1 - 11.1 K/uL    RBC 4.75 4.10 - 5.70 M/uL    HGB 13.9 12.1 - 17.0 g/dL    HCT 43.3 36.6 - 50.3 %    MCV 91.2 80.0 - 99.0 FL    MCH 29.3 26.0 - 34.0 PG    MCHC 32.1 30.0 - 36.5 g/dL    RDW 15.7 (H) 11.5 - 14.5 %    PLATELET 685 064 - 389 K/uL    MPV 12.4 8.9 - 12.9 FL    NRBC 0.0 0  WBC    ABSOLUTE NRBC 0.00 0.00 - 0.01 K/uL    NEUTROPHILS 44 32 - 75 %    LYMPHOCYTES 44 12 - 49 %    MONOCYTES 9 5 - 13 %    EOSINOPHILS 2 0 - 7 %    BASOPHILS 1 0 - 1 %    IMMATURE GRANULOCYTES 0 0.0 - 0.5 %    ABS. NEUTROPHILS 3.4 1.8 - 8.0 K/UL    ABS.  LYMPHOCYTES 3.5 0.8 - 3.5 K/UL    ABS. MONOCYTES 0.7 0.0 - 1.0 K/UL    ABS. EOSINOPHILS 0.1 0.0 - 0.4 K/UL    ABS. BASOPHILS 0.1 0.0 - 0.1 K/UL    ABS. IMM. GRANS. 0.0 0.00 - 0.04 K/UL    DF AUTOMATED     LIPID PANEL   Result Value Ref Range    Cholesterol, total 200 (H) <200 MG/DL    Triglyceride 113 <150 MG/DL    HDL Cholesterol 63 MG/DL    LDL, calculated 114.4 (H) 0 - 100 MG/DL    VLDL, calculated 22.6 MG/DL    CHOL/HDL Ratio 3.2 0.0 - 5.0     PSA, DIAGNOSTIC (PROSTATE SPECIFIC AG)   Result Value Ref Range    Prostate Specific Ag 3.1 0.01 - 4.0 ng/mL   HEPATITIS C AB, RFLX TO QT BY PCR   Result Value Ref Range    HCV Ab 0.2 0.0 - 0.9 s/co ratio   SAMPLES BEING HELD   Result Value Ref Range    SAMPLES BEING HELD 1SST     COMMENT        Add-on orders for these samples will be processed based on acceptable specimen integrity and analyte stability, which may vary by analyte. HCV INTERPRETATION   Result Value Ref Range    HCV Interpretation Comment         Assessment/Plan:    ICD-10-CM ICD-9-CM    1. Essential hypertension  I10 401.9 lisinopriL (PRINIVIL, ZESTRIL) 10 mg tablet   2. Hyperlipidemia, unspecified hyperlipidemia type  E78.5 272.4 atorvastatin (LIPITOR) 10 mg tablet   3. Bibasilar crackles  R09.89 786.7    4. Colon cancer screening  Z12.11 V76.51 REFERRAL TO GASTROENTEROLOGY     Orders Placed This Encounter    REFERRAL TO GASTROENTEROLOGY     Referral Priority:   Routine     Referral Type:   Consultation     Referral Reason:   Specialty Services Required     Referred to Provider:   Wanda Ponce MD     Requested Specialty:   Gastroenterology     Number of Visits Requested:   1    lisinopriL (PRINIVIL, ZESTRIL) 10 mg tablet     Sig: Take 1 Tablet by mouth daily. Dispense:  90 Tablet     Refill:  1    atorvastatin (LIPITOR) 10 mg tablet     Sig: Take 1 Tablet by mouth daily. Dispense:  90 Tablet     Refill:  1     1.  Essential hypertension  Refill med  - lisinopriL (PRINIVIL, ZESTRIL) 10 mg tablet; Take 1 Tablet by mouth daily. Dispense: 90 Tablet; Refill: 1    2. Hyperlipidemia, unspecified hyperlipidemia type  Refill med  - atorvastatin (LIPITOR) 10 mg tablet; Take 1 Tablet by mouth daily. Dispense: 90 Tablet; Refill: 1    3. Bibasilar crackles  Obtain pulm notes    4. Colon cancer screening    - REFERRAL TO GASTROENTEROLOGY      There are no Patient Instructions on file for this visit. Follow-up and Dispositions    · Return in about 8 weeks (around 5/24/2022) for bp and cholesterol check ON med. I have reviewed with the patient details of the assessment and plan and all questions were answered. Relevent patient education was performed. The most recent lab findings were reviewed with the patient. An After Visit Summary was printed and given to the patient.

## 2022-04-13 ENCOUNTER — PATIENT MESSAGE (OUTPATIENT)
Dept: INTERNAL MEDICINE CLINIC | Age: 62
End: 2022-04-13

## 2022-04-13 DIAGNOSIS — E78.5 HYPERLIPIDEMIA, UNSPECIFIED HYPERLIPIDEMIA TYPE: ICD-10-CM

## 2022-04-13 DIAGNOSIS — I10 ESSENTIAL HYPERTENSION: ICD-10-CM

## 2022-04-14 RX ORDER — ATORVASTATIN CALCIUM 10 MG/1
10 TABLET, FILM COATED ORAL DAILY
Qty: 90 TABLET | Refills: 1 | Status: SHIPPED | OUTPATIENT
Start: 2022-04-14

## 2022-04-14 RX ORDER — LISINOPRIL 10 MG/1
10 TABLET ORAL DAILY
Qty: 90 TABLET | Refills: 1 | Status: SHIPPED | OUTPATIENT
Start: 2022-04-14 | End: 2022-08-17

## 2022-06-21 ENCOUNTER — OFFICE VISIT (OUTPATIENT)
Dept: INTERNAL MEDICINE CLINIC | Age: 62
End: 2022-06-21
Payer: MEDICAID

## 2022-06-21 VITALS
BODY MASS INDEX: 27.58 KG/M2 | OXYGEN SATURATION: 98 % | RESPIRATION RATE: 18 BRPM | HEIGHT: 71 IN | DIASTOLIC BLOOD PRESSURE: 92 MMHG | TEMPERATURE: 98.1 F | HEART RATE: 79 BPM | WEIGHT: 197 LBS | SYSTOLIC BLOOD PRESSURE: 165 MMHG

## 2022-06-21 DIAGNOSIS — R09.89 BIBASILAR CRACKLES: ICD-10-CM

## 2022-06-21 DIAGNOSIS — I10 PRIMARY HYPERTENSION: Primary | ICD-10-CM

## 2022-06-21 DIAGNOSIS — E78.5 HYPERLIPIDEMIA, UNSPECIFIED HYPERLIPIDEMIA TYPE: ICD-10-CM

## 2022-06-21 DIAGNOSIS — R93.89 ABNORMAL CHEST X-RAY: ICD-10-CM

## 2022-06-21 PROCEDURE — 99214 OFFICE O/P EST MOD 30 MIN: CPT | Performed by: INTERNAL MEDICINE

## 2022-06-21 RX ORDER — AMLODIPINE BESYLATE 10 MG/1
10 TABLET ORAL DAILY
Qty: 30 TABLET | Refills: 2 | Status: SHIPPED | OUTPATIENT
Start: 2022-06-21 | End: 2022-09-12

## 2022-06-21 NOTE — PROGRESS NOTES
Nathaniel Castro is a 60 y.o. 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 Subjective:    Pt has not f/u w pulm  Pt has not scheduled colonoscopy    Pt relays his congestion/mucous is from his lungs, not nose. PMH- HTN- on lisinopril 10 mg     BP Readings from Last 3 Encounters:   22 (!) 165/92   22 (!) 150/87   21 137/89      Hyperlipidemia- on atorvastatin 10mg  Lab Results   Component Value Date/Time    Cholesterol, total 200 (H) 2021 10:04 AM    HDL Cholesterol 63 2021 10:04 AM    LDL, calculated 114.4 (H) 2021 10:04 AM    VLDL, calculated 22.6 2021 10:04 AM    Triglyceride 113 2021 10:04 AM    CHOL/HDL Ratio 3.2 2021 10:04 AM     Lab Results   Component Value Date/Time    ALT (SGPT) 14 2021 10:04 AM    AST (SGOT) 12 (L) 2021 10:04 AM    Alk. phosphatase 52 2021 10:04 AM    Bilirubin, total 1.1 (H) 2021 10:04 AM      Chronic lbp/OA   Abn CXR- pulm recommended ct scan      PSH-none    SH- single   Self employed   No tob, occas alcohol, no illicit drugs   Lives alone no children    FH- father  ? ? Mother  CAD, HTN   1 brother HTN    HM    Colonoscopy ~ 5 yrs ago in [address] as per pt  Immunizations declines flu vaccine  Eye care UTD  Dental care ~ 1 mth ago          Review of Systems  Constitutional: negative for fevers, chills, anorexia and weight loss  Eyes:   negative for visual disturbance and irritation  ENT:   negative for tinnitus,sore throat,nasal congestion,ear pains. hoarseness  Respiratory:  negative for cough, hemoptysis, dyspnea,wheezing  CV:   negative for chest pain, palpitations, lower extremity edema  GI:   negative for nausea, vomiting, diarrhea, abdominal pain,melena  Endo:               negative for polyuria,polydipsia,polyphagia,heat intolerance  Musculoskel: positive for  arthralgias, back pain,  joint pain  Neurological:  negative for headaches, dizziness, vertigo, memory problems and gait   Behavl/Psych: negative for feelings of anxiety, depression, mood changes    Past Medical History:   Diagnosis Date    Hypercholesterolemia     Hypertension      History reviewed. No pertinent surgical history. Social History     Socioeconomic History    Marital status: UNKNOWN   Tobacco Use    Smoking status: Former Smoker     Packs/day: 0.25     Years: 2.00     Pack years: 0.50     Quit date: 2000     Years since quittin.8    Smokeless tobacco: Never Used   Vaping Use    Vaping Use: Never used   Substance and Sexual Activity    Alcohol use: Yes     Comment: social drinker    Drug use: Never    Sexual activity: Yes     Partners: Female     Birth control/protection: Condom     History reviewed. No pertinent family history. Current Outpatient Medications   Medication Sig Dispense Refill    amLODIPine (NORVASC) 10 mg tablet Take 1 Tablet by mouth daily. 30 Tablet 2    atorvastatin (LIPITOR) 10 mg tablet Take 1 Tablet by mouth daily. 90 Tablet 1    lisinopriL (PRINIVIL, ZESTRIL) 10 mg tablet Take 1 Tablet by mouth daily. 90 Tablet 1     No Known Allergies    Objective:  Visit Vitals  BP (!) 165/92 (BP 1 Location: Left arm, BP Patient Position: Sitting, BP Cuff Size: Large adult)   Pulse 79   Temp 98.1 °F (36.7 °C) (Temporal)   Resp 18   Ht 5' 11\" (1.803 m)   Wt 197 lb (89.4 kg)   SpO2 98%   BMI 27.48 kg/m²     Physical Exam:   General appearance - alert, well appearing, and in no distress.  Pleasant  Mental status - alert, oriented to person, place, and time  EYE-EOMI  Neck - supple, no significant adenopathy   Chest - clear to auscultation, no wheezes, rales or rhonchi, symmetric air entry +bibasilar rales Does NOT clear w cough  Heart - normal rate, regular rhythm, normal S1, S2, no murmurs, rubs, clicks or gallops   Abdomen - soft, nontender, nondistended, no masses or organomegaly + small, reducible umbil hernia  Ext-peripheral pulses normal, no pedal edema, no clubbing or cyanosis  Skin-Warm and dry. no hyperpigmentation, vitiligo, or suspicious lesions  Neuro -alert, oriented, normal speech, no focal findings or movement disorder noted      Results for orders placed or performed in visit on 09/21/21   CHLAMYDIA / GC-AMPLIFIED   Result Value Ref Range    Source URINE     Chlamydia trachomatis, SAMANTHA Negative Negative      Neisseria gonorrhoeae, SAMANTHA Negative Negative     T VAGINALIS AMPLIFICATION   Result Value Ref Range    Source URINE     T. vaginalis by SAMANTHA Negative Negative     HIV 1/2 AG/AB, 4TH GENERATION,W RFLX CONFIRM   Result Value Ref Range    HIV 1/2 Interpretation NONREACTIVE NONREACTIVE      HIV 1/2 result comment SEE NOTE     METABOLIC PANEL, COMPREHENSIVE   Result Value Ref Range    Sodium 141 136 - 145 mmol/L    Potassium 4.1 3.5 - 5.1 mmol/L    Chloride 109 (H) 97 - 108 mmol/L    CO2 29 21 - 32 mmol/L    Anion gap 3 (L) 5 - 15 mmol/L    Glucose 81 65 - 100 mg/dL    BUN 16 6 - 20 MG/DL    Creatinine 1.10 0.70 - 1.30 MG/DL    BUN/Creatinine ratio 15 12 - 20      GFR est AA >60 >60 ml/min/1.73m2    GFR est non-AA >60 >60 ml/min/1.73m2    Calcium 9.3 8.5 - 10.1 MG/DL    Bilirubin, total 1.1 (H) 0.2 - 1.0 MG/DL    ALT (SGPT) 14 12 - 78 U/L    AST (SGOT) 12 (L) 15 - 37 U/L    Alk.  phosphatase 52 45 - 117 U/L    Protein, total 7.6 6.4 - 8.2 g/dL    Albumin 3.6 3.5 - 5.0 g/dL    Globulin 4.0 2.0 - 4.0 g/dL    A-G Ratio 0.9 (L) 1.1 - 2.2     CBC WITH AUTOMATED DIFF   Result Value Ref Range    WBC 7.8 4.1 - 11.1 K/uL    RBC 4.75 4.10 - 5.70 M/uL    HGB 13.9 12.1 - 17.0 g/dL    HCT 43.3 36.6 - 50.3 %    MCV 91.2 80.0 - 99.0 FL    MCH 29.3 26.0 - 34.0 PG    MCHC 32.1 30.0 - 36.5 g/dL    RDW 15.7 (H) 11.5 - 14.5 %    PLATELET 738 409 - 148 K/uL    MPV 12.4 8.9 - 12.9 FL    NRBC 0.0 0  WBC    ABSOLUTE NRBC 0.00 0.00 - 0.01 K/uL    NEUTROPHILS 44 32 - 75 %    LYMPHOCYTES 44 12 - 49 %    MONOCYTES 9 5 - 13 %    EOSINOPHILS 2 0 - 7 %    BASOPHILS 1 0 - 1 %    IMMATURE GRANULOCYTES 0 0.0 - 0.5 %    ABS. NEUTROPHILS 3.4 1.8 - 8.0 K/UL    ABS. LYMPHOCYTES 3.5 0.8 - 3.5 K/UL    ABS. MONOCYTES 0.7 0.0 - 1.0 K/UL    ABS. EOSINOPHILS 0.1 0.0 - 0.4 K/UL    ABS. BASOPHILS 0.1 0.0 - 0.1 K/UL    ABS. IMM. GRANS. 0.0 0.00 - 0.04 K/UL    DF AUTOMATED     LIPID PANEL   Result Value Ref Range    Cholesterol, total 200 (H) <200 MG/DL    Triglyceride 113 <150 MG/DL    HDL Cholesterol 63 MG/DL    LDL, calculated 114.4 (H) 0 - 100 MG/DL    VLDL, calculated 22.6 MG/DL    CHOL/HDL Ratio 3.2 0.0 - 5.0     PSA, DIAGNOSTIC (PROSTATE SPECIFIC AG)   Result Value Ref Range    Prostate Specific Ag 3.1 0.01 - 4.0 ng/mL   HEPATITIS C AB, RFLX TO QT BY PCR   Result Value Ref Range    HCV Ab 0.2 0.0 - 0.9 s/co ratio   SAMPLES BEING HELD   Result Value Ref Range    SAMPLES BEING HELD 1SST     COMMENT        Add-on orders for these samples will be processed based on acceptable specimen integrity and analyte stability, which may vary by analyte. HCV INTERPRETATION   Result Value Ref Range    HCV Interpretation Comment         Assessment/Plan:    ICD-10-CM ICD-9-CM    1. Primary hypertension  I10 401.9 amLODIPine (NORVASC) 10 mg tablet   2. Hyperlipidemia, unspecified hyperlipidemia type  E78.5 272.4    3. Bibasilar crackles  R09.89 786.7 REFERRAL TO PULMONARY DISEASE   4. Abnormal chest x-ray  R93.89 793.2 REFERRAL TO PULMONARY DISEASE     Orders Placed This Encounter    REFERRAL TO PULMONARY DISEASE     Referral Priority:   Routine     Referral Type:   Consultation     Referral Reason:   Specialty Services Required     Requested Specialty:   Pulmonary Disease     Number of Visits Requested:   1    amLODIPine (NORVASC) 10 mg tablet     Sig: Take 1 Tablet by mouth daily.      Dispense:  30 Tablet Refill:  2     Takes the place of lisinopril     1. Primary hypertension  D/c lisinopril  - amLODIPine (NORVASC) 10 mg tablet; Take 1 Tablet by mouth daily. Dispense: 30 Tablet; Refill: 2    2. Hyperlipidemia, unspecified hyperlipidemia type  Cont statin    3. Bibasilar crackles    - REFERRAL TO PULMONARY DISEASE    4. Abnormal chest x-ray    - REFERRAL TO PULMONARY DISEASE        There are no Patient Instructions on file for this visit. Follow-up and Dispositions    · Return in about 2 months (around 8/21/2022) for bp check . I have reviewed with the patient details of the assessment and plan and all questions were answered. Relevent patient education was performed. The most recent lab findings were reviewed with the patient. An After Visit Summary was printed and given to the patient.

## 2022-06-21 NOTE — PROGRESS NOTES
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 \"Have you been to the ER, urgent care clinic since your last visit? Hospitalized since your last visit? \" No    2. \"Have you seen or consulted any other health care providers outside of the [address] [place] [address] since your last visit? \" No     3. For patients aged 39-70: Has the patient had a colonoscopy / FIT/ Cologuard? No      If the patient is female:    4. For patients aged 41-77: Has the patient had a mammogram within the past 2 years? NA - based on age or sex      11. For patients aged 21-65: Has the patient had a pap smear?  NA - based on age or sex

## 2022-08-12 ENCOUNTER — NURSE TRIAGE (OUTPATIENT)
Dept: OTHER | Facility: CLINIC | Age: 62
End: 2022-08-12
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
## 2022-08-15 ENCOUNTER — TELEPHONE (OUTPATIENT)
Dept: INTERNAL MEDICINE CLINIC | Age: 62
End: 2022-08-15
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
## 2022-08-16 RX ORDER — CIPROFLOXACIN HYDROCHLORIDE 3.5 MG/ML
1 SOLUTION/ DROPS TOPICAL
Qty: 10 ML | Refills: 0 | Status: SHIPPED | OUTPATIENT
Start: 2022-08-16 | End: 2022-08-21

## 2022-08-17 ENCOUNTER — OFFICE VISIT (OUTPATIENT)
Dept: INTERNAL MEDICINE CLINIC | Age: 62
End: 2022-08-17
Payer: MEDICAID

## 2022-08-17 VITALS
BODY MASS INDEX: 27.23 KG/M2 | RESPIRATION RATE: 17 BRPM | DIASTOLIC BLOOD PRESSURE: 82 MMHG | SYSTOLIC BLOOD PRESSURE: 131 MMHG | TEMPERATURE: 97.2 F | HEART RATE: 85 BPM | WEIGHT: 194.5 LBS | HEIGHT: 71 IN | OXYGEN SATURATION: 96 %

## 2022-08-17 DIAGNOSIS — I10 ESSENTIAL HYPERTENSION: ICD-10-CM

## 2022-08-17 DIAGNOSIS — M25.50 ARTHRALGIA, UNSPECIFIED JOINT: ICD-10-CM
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
## 2022-08-17 PROCEDURE — 99214 OFFICE O/P EST MOD 30 MIN: CPT | Performed by: INTERNAL MEDICINE
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
## 2022-08-17 NOTE — LETTER
NOTIFICATION RETURN TO WORK / SCHOOL    8/17/2022 9:02 AM    Mr. Reza Herman  [address] [place] [id number]      To Whom It May Concern:    Reza Herman is currently under the care of Nickolas Lynn. He will return to work/school on: 8/22/2022    If there are questions or concerns please have the patient contact our office.         Sincerely,      Troy Pandya MD

## 2022-08-30 ENCOUNTER — HOSPITAL ENCOUNTER (OUTPATIENT)
Dept: PHYSICAL THERAPY | Age: 62
Discharge: HOME | End: 2022-08-30
Payer: MEDICAID

## 2022-08-30 PROCEDURE — 97161 PT EVAL LOW COMPLEX 20 MIN: CPT | Performed by: PHYSICAL THERAPIST

## 2022-08-30 PROCEDURE — 97110 THERAPEUTIC EXERCISES: CPT | Performed by: PHYSICAL THERAPIST
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
## 2022-09-06 DIAGNOSIS — R51.9 NONINTRACTABLE HEADACHE, UNSPECIFIED CHRONICITY PATTERN, UNSPECIFIED HEADACHE TYPE: Primary | ICD-10-CM

## 2022-09-07 ENCOUNTER — HOSPITAL ENCOUNTER (OUTPATIENT)
Dept: PHYSICAL THERAPY | Age: 62
Discharge: HOME | End: 2022-09-07
Payer: MEDICAID

## 2022-09-07 PROCEDURE — 97110 THERAPEUTIC EXERCISES: CPT | Performed by: PHYSICAL THERAPIST

## 2022-09-07 PROCEDURE — 97140 MANUAL THERAPY 1/> REGIONS: CPT | Performed by: PHYSICAL THERAPIST

## 2022-09-07 NOTE — PROGRESS NOTES
[facility]  [address], [address] [facility]    OUTPATIENT PHYSICAL THERAPY DAILY TREATMENT NOTE  VISIT: 2    NAME: Aries Kothari AGE: 60 y.o. GENDER: male  DATE: 9/7/2022  REFERRING PHYSICIAN: Phil Roland MD    GOALS  Short term goals  Time frame: 4 weeks  1. Patient will be compliant and independent with the initial HEP as evidenced by being able to perform without cuing. 2. Patient will report a 25% improvement in symptoms. 3. Patient report a 25% improvement in sleeping. 4. Patient will have a 20 degree increase in bilateral cervical rotation ROM to allow ease with driving. 5. Patient will have an increased tolerance for standing to allow 10 minutes of the activity before symptoms start. 6. Patient will tolerate 15 minutes of clinic activities before increase of symptoms. Long term goals  Time frame: 8 weeks  1. Patient will report pain level decrease to 2/10 to allow increased ease of movement. 2. Patient will have an improved score on the NDI outcome measure by 9 points to demonstrate an increase in functional activity tolerance. 3. Patient will be independent in final individualized HEP. 4. Patient will have an increase in bilateral cervical rotation ROM to 60 degrees to allow performance of work tasks. 5. Patient will have an increase in shoulder flexion strength to 5/5 to allow performance of work tasks. 6. Patient will return to standing without being limited by symptoms. 7. Patient will sleep 6-8 hours without being interrupted by pain. SUBJECTIVE:   \"It's the same. \"    Pain In: 9/10 neck and back    OBJECTIVE DATA SUMMARY:   Objective data from initial evaluation:   EXAMINATION/PRESENTATION/DECISION MAKING:   Pain:  Location: Neck and back, radiates into legs  Quality: aching, sharp, and throbbing  Now: 10/10   Best: 7/10  Worst:10/10  Factors that improve pain: rest, heat, medication: used and beneficial     Posture:   Slouched posture  Rounded shoulders     Strength:                                          LEFT                               RIGHT  Shoulder flexion              3+/5; pain in UT              3+/5; pain in UT     Unable to tolerate BLE resisted testing due to back pain     Range of Motion:   Cervical Spine (AROM)  Flexion               75% limited; pain at posterior neck  Extension           100% limited; pain at posterior neck  R side bend       75% limited; pain in B neck  L side bend        75% limited; pain in B neck  R rotation           15 degrees; pain in B neck  L rotation           10 degrees; pain in B neck     Lumbar Spine (AROM)  (*Measured 3rd finger from the floor)  Flexion               Hands to thighs; pain in back  Extension           To neutral; pain in back  R side bend       75% limited; pain at B back  L side bend        75% limited; pain at B back  R rotation           75% limited; pain at B back  L rotation           75% limited; pain at B back      Joint Mobility:   Unable to tolerate due to pain     Palpation:   Tender to palpation at B UT, levator scapulae, suboccipitals, SCM, rhomboids, and cervical paraspinals  Tender to palpation at B QL, gluteals, and thoracic/lumbar paraspinals     Neurologic Assessment:               Tone: Normal               Sensation: Intact to light touch; Occasional tingling in B feet reported               Reflexes: NT     Special Tests:   (+) SLR     Mobility:               Transitional Movements: Increased time and effort to perform sit to stands and bed mobility; independent                Gait: Slow pace, trunk flexion  Balance:   WNL     Functional Measure:   NDI: 23/64    TREATMENT/INTERVENTION:  Modalities (Rationale): None this date     Therapeutic Exercises: to develop strength, endurance, range of motion, and flexibility  Initial HEP provided 8/30/22 (Access Code [email or web address]): UT stretch; LTR     Exercises in BOLD performed this date:      Seated:   UT stretch: 2 sets of 5 reps B      Supine:   LTR: 2 sets of 5 reps B  Cervical rotation: 5 reps B   PROM hamstring stretch, SKTC, and piriformis stretches x3 B      Manual Therapy: for joint mobilization/manipulations and soft tissue mobilization  STM to bilateral UT, levator scapulae, and cervical paraspinals in supine  Gentle cervical rotation and SB stretches in supine     Neuro Re-Education: to improve movement, balance, coordination, kinesthetic sense, posture, and proprioception for sitting or standing balance  None this date  Educated on use of towel roll for cervical support when sleeping     Therapeutic Activities: to improve functional performance, power, or agility  None this date     Ambulation/Gait Training:  None this date     Activity tolerance and post treatment pain report:   Good; 9/10    Education:  Education was provided to the patient on the following topics: continue HEP in pain free ROM. []    No changes were made to the home exercise program.  [x]    The following changes were made to the home exercise program: added supine cervical rotation to HEP  Patient verbalized understanding of the topics presented. ASSESSMENT:   Patient returns following initial evaluation. Patient presents to PT with continued pain in neck and back. Patient with fairly regular performance of HEP. Added supine cervical rotation AROM to HEP. Patient tolerated clinic exercises well with rest breaks provided and cues for form. Patient tolerated manual therapy well. Patient continues to demonstrate trunk flexion with ambulation. He also continues to experience frequent headaches. Patient will continue to benefit from skilled physical therapy to improve strength, ROM, activity tolerance, and mobility.      Patients progression toward goals is as follows:  [x]     Improving appropriately and progressing toward goals  []     Improving slowly and progressing toward goals  []     Not making progress toward goals and plan of care will be adjusted    PLAN OF CARE:   Patient continues to benefit from skilled intervention to address the above impairments. [x]    Continue treatment per established plan of care.   []     Recommend the following changes to the plan of care    Recommendations/Intent for next treatment: added supine SKTC to HEP; STM to B thoracic and lumbar paraspinals     Tarri Lesches, PT   Time Calculation: 42 mins  Patient Time in clinic:   Start Time: [address]   Stop Time: [address]

## 2022-09-11 DIAGNOSIS — I10 PRIMARY HYPERTENSION: ICD-10-CM

## 2022-09-12 RX ORDER — AMLODIPINE BESYLATE 10 MG/1
TABLET ORAL
Qty: 30 TABLET | Refills: 2 | Status: SHIPPED | OUTPATIENT
Start: 2022-09-12

## 2022-09-13 ENCOUNTER — HOSPITAL ENCOUNTER (OUTPATIENT)
Dept: PHYSICAL THERAPY | Age: 62
Discharge: HOME | End: 2022-09-13
Payer: MEDICAID

## 2022-09-13 PROCEDURE — 97140 MANUAL THERAPY 1/> REGIONS: CPT | Performed by: PHYSICAL THERAPIST

## 2022-09-13 PROCEDURE — 97110 THERAPEUTIC EXERCISES: CPT | Performed by: PHYSICAL THERAPIST

## 2022-09-13 NOTE — PROGRESS NOTES
[facility]  [address], [address] [facility]    OUTPATIENT PHYSICAL THERAPY DAILY TREATMENT NOTE  VISIT: 3    NAME: Aleena Nicolas AGE: 60 y.o. GENDER: male  DATE: 9/13/2022  REFERRING PHYSICIAN: Parminder Araya MD    GOALS  Short term goals  Time frame: 4 weeks  1. Patient will be compliant and independent with the initial HEP as evidenced by being able to perform without cuing. 2. Patient will report a 25% improvement in symptoms. 3. Patient report a 25% improvement in sleeping. 4. Patient will have a 20 degree increase in bilateral cervical rotation ROM to allow ease with driving. 5. Patient will have an increased tolerance for standing to allow 10 minutes of the activity before symptoms start. 6. Patient will tolerate 15 minutes of clinic activities before increase of symptoms. Long term goals  Time frame: 8 weeks  1. Patient will report pain level decrease to 2/10 to allow increased ease of movement. 2. Patient will have an improved score on the NDI outcome measure by 9 points to demonstrate an increase in functional activity tolerance. 3. Patient will be independent in final individualized HEP. 4. Patient will have an increase in bilateral cervical rotation ROM to 60 degrees to allow performance of work tasks. 5. Patient will have an increase in shoulder flexion strength to 5/5 to allow performance of work tasks. 6. Patient will return to standing without being limited by symptoms. 7. Patient will sleep 6-8 hours without being interrupted by pain. SUBJECTIVE:   \"It's still painful. \"    Pain In: 8/10 neck and back    OBJECTIVE DATA SUMMARY:   Objective data from initial evaluation:   EXAMINATION/PRESENTATION/DECISION MAKING:   Pain:  Location: Neck and back, radiates into legs  Quality: aching, sharp, and throbbing  Now: 10/10   Best: 7/10  Worst:10/10  Factors that improve pain: rest, heat, medication: used and beneficial     Posture:   Slouched posture  Rounded shoulders     Strength:                                          LEFT                               RIGHT  Shoulder flexion              3+/5; pain in UT              3+/5; pain in UT     Unable to tolerate BLE resisted testing due to back pain     Range of Motion:   Cervical Spine (AROM)  Flexion               75% limited; pain at posterior neck  Extension           100% limited; pain at posterior neck  R side bend       75% limited; pain in B neck  L side bend        75% limited; pain in B neck  R rotation           15 degrees; pain in B neck  L rotation           10 degrees; pain in B neck     Lumbar Spine (AROM)  (*Measured 3rd finger from the floor)  Flexion               Hands to thighs; pain in back  Extension           To neutral; pain in back  R side bend       75% limited; pain at B back  L side bend        75% limited; pain at B back  R rotation           75% limited; pain at B back  L rotation           75% limited; pain at B back      Joint Mobility:   Unable to tolerate due to pain     Palpation:   Tender to palpation at B UT, levator scapulae, suboccipitals, SCM, rhomboids, and cervical paraspinals  Tender to palpation at B QL, gluteals, and thoracic/lumbar paraspinals     Neurologic Assessment:               Tone: Normal               Sensation: Intact to light touch; Occasional tingling in B feet reported               Reflexes: NT     Special Tests:   (+) SLR     Mobility:               Transitional Movements: Increased time and effort to perform sit to stands and bed mobility; independent                Gait: Slow pace, trunk flexion  Balance:   WNL     Functional Measure:   NDI: 39/50    TREATMENT/INTERVENTION:  Modalities (Rationale): to decrease pain and muscle guarding  MHP to neck and low back for 5 minutes in supine at start of session; no skin irritation pre or post treatment     Therapeutic Exercises: to develop strength, endurance, range of motion, and flexibility  Initial HEP provided 8/30/22 (Access Code Rodri Martinez): UT stretch; LTR  Added to HEP 9/13/22: Electa Braymer with towel; supine quadriceps/hip flexor stretch; shoulder rolls      Exercises in BOLD performed this date:      Seated:   UT stretch: 2 sets of 5 reps B   Shoulder rolls: 2 sets of 5 reps     Supine:   LTR: 2 sets of 5 reps B  Cervical rotation: 5 reps B  Quadriceps/hip flexor stretch: 2 reps with 20 sec holds  SKTC with towel: 5 reps   PROM hamstring stretch and piriformis stretches x3 B      Manual Therapy: for joint mobilization/manipulations and soft tissue mobilization (30 minutes)  STM to bilateral UT, levator scapulae, and cervical paraspinals in sitting  Instrument assisted soft tissue mobilization to B UT, levator scapulae, and cervical paraspinals. Patient educated on purpose and affect of intervention with good understanding verbalized. STM to bilateral QL, TFL/IT band, hip flexors, gluteals, and thoracic/lumbar paraspinals with lacrosse ball in sidelying  Gentle trigger point release to B gluteals with hip ER/IR in prone  PA mobs, T11-L5, grade 1-2 in prone     Neuro Re-Education: to improve movement, balance, coordination, kinesthetic sense, posture, and proprioception for sitting or standing balance  None this date  Educated on use of towel roll for cervical support when sleeping     Therapeutic Activities: to improve functional performance, power, or agility  None this date     Ambulation/Gait Training:  None this date     Activity tolerance and post treatment pain report:   Good; 7/10    Education:  Education was provided to the patient on the following topics: continue HEP in pain free ROM. []    No changes were made to the home exercise program.  [x]    The following changes were made to the home exercise program: added to HEP (see above)  Patient verbalized understanding of the topics presented. ASSESSMENT:   Patient presents with continued tightness, stiffness, and pain in neck and low back.  Right sided low back and neck musculature appears more tight and tender than left. He continues to experience frequent headaches. Patient with hypomobility noted in lower thoracic and lumbar spine. He continues to demonstrate trunk flexion with ambulation. Patient with regular performance of HEP. Added to HEP this date with good understanding. Patient tolerated clinic exercises well with rest breaks provided and cues for form. Increased time spend on manual therapy with pain relief by end of session. Patient will continue to benefit from skilled physical therapy to improve strength, ROM, activity tolerance, and mobility. Patients progression toward goals is as follows:  [x]     Improving appropriately and progressing toward goals  []     Improving slowly and progressing toward goals  []     Not making progress toward goals and plan of care will be adjusted    PLAN OF CARE:   Patient continues to benefit from skilled intervention to address the above impairments. [x]    Continue treatment per established plan of care.   []     Recommend the following changes to the plan of care    Recommendations/Intent for next treatment: check in about HEP    Hyacinth Phillip, PT   Time Calculation: 70 mins  Patient Time in clinic:   Start Time: 1500   Stop Time: [phone]

## 2022-09-20 ENCOUNTER — HOSPITAL ENCOUNTER (OUTPATIENT)
Dept: PHYSICAL THERAPY | Age: 62
Discharge: HOME | End: 2022-09-20
Payer: MEDICAID

## 2022-09-20 ENCOUNTER — OFFICE VISIT (OUTPATIENT)
Dept: INTERNAL MEDICINE CLINIC | Age: 62
End: 2022-09-20
Payer: MEDICAID

## 2022-09-20 VITALS
RESPIRATION RATE: 18 BRPM | HEART RATE: 82 BPM | SYSTOLIC BLOOD PRESSURE: 149 MMHG | BODY MASS INDEX: 27.3 KG/M2 | HEIGHT: 71 IN | DIASTOLIC BLOOD PRESSURE: 100 MMHG | TEMPERATURE: 91 F | OXYGEN SATURATION: 98 % | WEIGHT: 195 LBS

## 2022-09-20 DIAGNOSIS — Z12.11 COLON CANCER SCREENING: ICD-10-CM

## 2022-09-20 DIAGNOSIS — I10 ESSENTIAL HYPERTENSION: Primary | ICD-10-CM

## 2022-09-20 DIAGNOSIS — Z11.3 SCREEN FOR STD (SEXUALLY TRANSMITTED DISEASE): ICD-10-CM

## 2022-09-20 PROCEDURE — 97110 THERAPEUTIC EXERCISES: CPT | Performed by: PHYSICAL THERAPIST

## 2022-09-20 PROCEDURE — 99213 OFFICE O/P EST LOW 20 MIN: CPT | Performed by: INTERNAL MEDICINE

## 2022-09-20 PROCEDURE — 97140 MANUAL THERAPY 1/> REGIONS: CPT | Performed by: PHYSICAL THERAPIST

## 2022-09-20 NOTE — PROGRESS NOTES
Radha De Guzman is a 60 y.o. male  Chief Complaint   Patient presents with    Follow Up Chronic Condition       1. Have you been to the ER,  no urgent care clinic since your last visit? no Hospitalized since your last visit? 2. Have you seen or consulted any other health care providers outside of the [address] [place] [address] since your last visit? No Include any pap smears or colon screening.  no

## 2022-09-20 NOTE — PROGRESS NOTES
Gayathri Gordon is a 60 y.o. male and presents with Follow Up Chronic Condition (Need referrals For Colonoscopy, labs,  )  . Subjective:    Pt requests referral previously given to be re-printed    PMH- HTN- on amlodipine 10mg     BP Readings from Last 3 Encounters:   22 (!) 149/100   22 131/82   22 (!) 165/92      Hyperlipidemia- on atorvastatin 10mg  Lab Results   Component Value Date/Time    Cholesterol, total 200 (H) 2021 10:04 AM    HDL Cholesterol 63 2021 10:04 AM    LDL, calculated 114.4 (H) 2021 10:04 AM    VLDL, calculated 22.6 2021 10:04 AM    Triglyceride 113 2021 10:04 AM    CHOL/HDL Ratio 3.2 2021 10:04 AM     Lab Results   Component Value Date/Time    ALT (SGPT) 14 2021 10:04 AM    AST (SGOT) 12 (L) 2021 10:04 AM    Alk. phosphatase 52 2021 10:04 AM    Bilirubin, total 1.1 (H) 2021 10:04 AM      Chronic lbp/OA   Abn CXR- pulm recommended ct scan      PSH-none    SH- single   Self employed   No tob, occas alcohol, no illicit drugs   Lives alone no children    FH- father  ? ? Mother  CAD, HTN   1 brother HTN    HM    Colonoscopy ~ 5 yrs ago in [facility] as per pt  Immunizations declines flu vaccine  Eye care UTD  Dental care ~ 1 mth ago          Review of Systems  Constitutional: negative for fevers, chills, anorexia and weight loss  Eyes:   negative for visual disturbance and irritation  ENT:   negative for tinnitus,sore throat,nasal congestion,ear pains. hoarseness  Respiratory:  negative for cough, hemoptysis, dyspnea,wheezing  CV:   negative for chest pain, palpitations, lower extremity edema  GI:   negative for nausea, vomiting, diarrhea, abdominal pain,melena  Endo:               negative for polyuria,polydipsia,polyphagia,heat intolerance  Musculoskel: positive for  arthralgias, back pain,  joint pain  Neurological:  negative for headaches, dizziness, vertigo, memory problems and gait   Behavl/Psych: negative for feelings of anxiety, depression, mood changes    Past Medical History:   Diagnosis Date    Hypercholesterolemia     Hypertension      No past surgical history on file. Social History     Socioeconomic History    Marital status:    Tobacco Use    Smoking status: Every Day     Packs/day: 0.25     Years: 2.00     Pack years: 0.50     Types: Cigarettes, Cigars     Last attempt to quit: 2000     Years since quittin.1    Smokeless tobacco: Never   Vaping Use    Vaping Use: Never used   Substance and Sexual Activity    Alcohol use: Yes     Comment: social drinker    Drug use: Never    Sexual activity: Yes     Partners: Female     Birth control/protection: Condom     No family history on file. Current Outpatient Medications   Medication Sig Dispense Refill    amLODIPine (NORVASC) 10 mg tablet TAKE 1 TABLET BY MOUTH EVERY DAY, TAKE THE PLACE OF LISINOPRIL 30 Tablet 2    atorvastatin (LIPITOR) 10 mg tablet Take 1 Tablet by mouth daily. 90 Tablet 1     Not on File    Objective:  Visit Vitals  BP (!) 149/100 (BP 1 Location: Left upper arm, BP Patient Position: Sitting, BP Cuff Size: Adult long)   Pulse 82   Temp (!) 91 °F (32.8 °C) (Temporal)   Resp 18   Ht 5' 11\" (1.803 m)   Wt 195 lb (88.5 kg)   SpO2 98%   BMI 27.20 kg/m²     Physical Exam:   General appearance - alert, well appearing, and in no distress   Mental status - alert, oriented to person, place, and time  EYE-EOMI  Neck - supple,   Chest - symmetric air entry    Ext-no pedal edema, no clubbing or cyanosis  Skin-Warm and dry.  no hyperpigmentation, vitiligo, or suspicious lesions  Neuro -alert, oriented, normal speech, no focal findings or movement disorder noted        Results for orders placed or performed in visit on 21   CHLAMYDIA / GC-AMPLIFIED   Result Value Ref Range    Source URINE     Chlamydia trachomatis, SAMANTHA Negative Negative      Neisseria gonorrhoeae, SAMANTHA Negative Negative     T VAGINALIS AMPLIFICATION   Result Value Ref Range    Source URINE     T. vaginalis by SAMANTHA Negative Negative     HIV 1/2 AG/AB, 4TH GENERATION,W RFLX CONFIRM   Result Value Ref Range    HIV 1/2 Interpretation NONREACTIVE NONREACTIVE      HIV 1/2 result comment SEE NOTE     METABOLIC PANEL, COMPREHENSIVE   Result Value Ref Range    Sodium 141 136 - 145 mmol/L    Potassium 4.1 3.5 - 5.1 mmol/L    Chloride 109 (H) 97 - 108 mmol/L    CO2 29 21 - 32 mmol/L    Anion gap 3 (L) 5 - 15 mmol/L    Glucose 81 65 - 100 mg/dL    BUN 16 6 - 20 MG/DL    Creatinine 1.10 0.70 - 1.30 MG/DL    BUN/Creatinine ratio 15 12 - 20      GFR est AA >60 >60 ml/min/1.73m2    GFR est non-AA >60 >60 ml/min/1.73m2    Calcium 9.3 8.5 - 10.1 MG/DL    Bilirubin, total 1.1 (H) 0.2 - 1.0 MG/DL    ALT (SGPT) 14 12 - 78 U/L    AST (SGOT) 12 (L) 15 - 37 U/L    Alk. phosphatase 52 45 - 117 U/L    Protein, total 7.6 6.4 - 8.2 g/dL    Albumin 3.6 3.5 - 5.0 g/dL    Globulin 4.0 2.0 - 4.0 g/dL    A-G Ratio 0.9 (L) 1.1 - 2.2     CBC WITH AUTOMATED DIFF   Result Value Ref Range    WBC 7.8 4.1 - 11.1 K/uL    RBC 4.75 4.10 - 5.70 M/uL    HGB 13.9 12.1 - 17.0 g/dL    HCT 43.3 36.6 - 50.3 %    MCV 91.2 80.0 - 99.0 FL    MCH 29.3 26.0 - 34.0 PG    MCHC 32.1 30.0 - 36.5 g/dL    RDW 15.7 (H) 11.5 - 14.5 %    PLATELET 252 243 - 931 K/uL    MPV 12.4 8.9 - 12.9 FL    NRBC 0.0 0  WBC    ABSOLUTE NRBC 0.00 0.00 - 0.01 K/uL    NEUTROPHILS 44 32 - 75 %    LYMPHOCYTES 44 12 - 49 %    MONOCYTES 9 5 - 13 %    EOSINOPHILS 2 0 - 7 %    BASOPHILS 1 0 - 1 %    IMMATURE GRANULOCYTES 0 0.0 - 0.5 %    ABS. NEUTROPHILS 3.4 1.8 - 8.0 K/UL    ABS. LYMPHOCYTES 3.5 0.8 - 3.5 K/UL    ABS. MONOCYTES 0.7 0.0 - 1.0 K/UL    ABS. EOSINOPHILS 0.1 0.0 - 0.4 K/UL    ABS. BASOPHILS 0.1 0.0 - 0.1 K/UL    ABS. IMM.  GRANS. 0.0 0.00 - 0.04 K/UL    DF AUTOMATED     LIPID PANEL   Result Value Ref Range    Cholesterol, total 200 (H) <200 MG/DL    Triglyceride 113 <150 MG/DL    HDL Cholesterol 63 MG/DL    LDL, calculated 114.4 (H) 0 - 100 MG/DL VLDL, calculated 22.6 MG/DL    CHOL/HDL Ratio 3.2 0.0 - 5.0     PSA, DIAGNOSTIC (PROSTATE SPECIFIC AG)   Result Value Ref Range    Prostate Specific Ag 3.1 0.01 - 4.0 ng/mL   HEPATITIS C AB, RFLX TO QT BY PCR   Result Value Ref Range    HCV Ab 0.2 0.0 - 0.9 s/co ratio   SAMPLES BEING HELD   Result Value Ref Range    SAMPLES BEING HELD 1SST     COMMENT        Add-on orders for these samples will be processed based on acceptable specimen integrity and analyte stability, which may vary by analyte. HCV INTERPRETATION   Result Value Ref Range    HCV Interpretation Comment         Assessment/Plan:    ICD-10-CM ICD-9-CM    1. Essential hypertension  I10 401.9 LIPID PANEL      2. Screen for STD (sexually transmitted disease)  Z11.3 V74.5 HIV 1/2 AG/AB, 4TH GENERATION,W RFLX CONFIRM      T VAGINALIS AMPLIFICATION      CHLAMYDIA / GC-AMPLIFIED      HEPATITIS C AB, RFLX TO QT BY PCR      3.  Colon cancer screening  Z12.11 V76.51 REFERRAL TO GASTROENTEROLOGY            Orders Placed This Encounter    LIPID PANEL     Standing Status:   Future     Standing Expiration Date:   9/20/2023    HIV 1/2 AG/AB, 4TH GENERATION,W RFLX CONFIRM     Standing Status:   Future     Standing Expiration Date:   9/20/2023    T VAGINALIS AMPLIFICATION     Standing Status:   Future     Standing Expiration Date:   9/20/2023     Order Specific Question:   Specimen source     Answer:   Urine [[id number]]    CHLAMYDIA / GC-AMPLIFIED     Standing Status:   Future     Standing Expiration Date:   9/20/2023     Order Specific Question:   Specimen source     Answer:   Urine [[id number]]    HEPATITIS C AB, RFLX TO QT BY PCR     Standing Status:   Future     Standing Expiration Date:   9/20/2023    REFERRAL TO GASTROENTEROLOGY     Referral Priority:   Routine     Referral Type:   Consultation     Referral Reason:   Specialty Services Required     Referred to Provider:   Adair Rowe MD     Requested Specialty:   Gastroenterology     Number of Visits Requested:   1 -referrals reprinted  Short term f/up bp check        There are no Patient Instructions on file for this visit. Follow-up and Dispositions    Return in about 8 weeks (around 11/15/2022) for bp f/u. I have reviewed with the patient details of the assessment and plan and all questions were answered. Relevent patient education was performed. The most recent lab findings were reviewed with the patient. An After Visit Summary was printed and given to the patient.

## 2022-09-20 NOTE — PROGRESS NOTES
[facility]  [address], [address] [facility]    OUTPATIENT PHYSICAL THERAPY DAILY TREATMENT NOTE  VISIT: 4    NAME: Yessenia Wilks AGE: 60 y.o. GENDER: male  DATE: 9/20/2022  REFERRING PHYSICIAN: Ewa Daniels MD    GOALS  Short term goals  Time frame: 4 weeks  1. Patient will be compliant and independent with the initial HEP as evidenced by being able to perform without cuing. 2. Patient will report a 25% improvement in symptoms. 3. Patient report a 25% improvement in sleeping. 4. Patient will have a 20 degree increase in bilateral cervical rotation ROM to allow ease with driving. 5. Patient will have an increased tolerance for standing to allow 10 minutes of the activity before symptoms start. 6. Patient will tolerate 15 minutes of clinic activities before increase of symptoms. Long term goals  Time frame: 8 weeks  1. Patient will report pain level decrease to 2/10 to allow increased ease of movement. 2. Patient will have an improved score on the NDI outcome measure by 9 points to demonstrate an increase in functional activity tolerance. 3. Patient will be independent in final individualized HEP. 4. Patient will have an increase in bilateral cervical rotation ROM to 60 degrees to allow performance of work tasks. 5. Patient will have an increase in shoulder flexion strength to 5/5 to allow performance of work tasks. 6. Patient will return to standing without being limited by symptoms. 7. Patient will sleep 6-8 hours without being interrupted by pain. SUBJECTIVE:   \"It's better than it was.  I had to do a lot of walking around today for appointments\"    Pain In: 8-9/10 neck and back    OBJECTIVE DATA SUMMARY:   Objective data from initial evaluation:   EXAMINATION/PRESENTATION/DECISION MAKING:   Pain:  Location: Neck and back, radiates into legs  Quality: aching, sharp, and throbbing  Now: 10/10   Best: 7/10  Worst:10/10  Factors that improve pain: rest, heat, medication: used and beneficial     Posture:   Slouched posture  Rounded shoulders     Strength:                                          LEFT                               RIGHT  Shoulder flexion              3+/5; pain in UT              3+/5; pain in UT     Unable to tolerate BLE resisted testing due to back pain     Range of Motion:   Cervical Spine (AROM)  Flexion               75% limited; pain at posterior neck  Extension           100% limited; pain at posterior neck  R side bend       75% limited; pain in B neck  L side bend        75% limited; pain in B neck  R rotation           15 degrees; pain in B neck  L rotation           10 degrees; pain in B neck     Lumbar Spine (AROM)  (*Measured 3rd finger from the floor)  Flexion               Hands to thighs; pain in back  Extension           To neutral; pain in back  R side bend       75% limited; pain at B back  L side bend        75% limited; pain at B back  R rotation           75% limited; pain at B back  L rotation           75% limited; pain at B back      Joint Mobility:   Unable to tolerate due to pain     Palpation:   Tender to palpation at B UT, levator scapulae, suboccipitals, SCM, rhomboids, and cervical paraspinals  Tender to palpation at B QL, gluteals, and thoracic/lumbar paraspinals     Neurologic Assessment:               Tone: Normal               Sensation: Intact to light touch; Occasional tingling in B feet reported               Reflexes: NT     Special Tests:   (+) SLR     Mobility:               Transitional Movements: Increased time and effort to perform sit to stands and bed mobility; independent                Gait: Slow pace, trunk flexion  Balance:   WNL     Functional Measure:   NDI: 39/50    TREATMENT/INTERVENTION:  Modalities (Rationale): to decrease pain and muscle guarding  MHP to neck and low back for 5 minutes in supine at start of session and with supine exercises; no skin irritation pre or post treatment Therapeutic Exercises: to develop strength, endurance, range of motion, and flexibility  Initial HEP provided 8/30/22 (Access Code [email or web address]): UT stretch; LTR  Added to HEP 9/13/22: Poncho Anes with towel; supine quadriceps/hip flexor stretch; shoulder rolls      Exercises in BOLD performed this date:      Seated:   Cervical SB AROM/UT stretch: 2 sets of 5 reps B   Shoulder rolls: 2 sets of 5 reps     Supine:   LTR: 2 sets of 5 reps B  SKTC with towel: 2 sets of 5 reps B  Cervical rotation: 5 reps B  Quadriceps/hip flexor stretch: 2 reps with 15 sec holds B   PROM hamstring stretch and piriformis stretches x3 B      Manual Therapy: for joint mobilization/manipulations and soft tissue mobilization   STM to bilateral UT, levator scapulae, and cervical paraspinals   Instrument assisted soft tissue mobilization to B UT, levator scapulae, and cervical paraspinals. Patient educated on purpose and affect of intervention with good understanding verbalized. Manual cervical distraction, suboccipital release in supine  STM to bilateral QL, TFL/IT band, hip flexors, gluteals, and thoracic/lumbar paraspinals with lacrosse ball in sidelying  Gentle trigger point release to B gluteals with hip ER/IR in prone  PA mobs, T11-L5, grade 2 in prone     Neuro Re-Education: to improve movement, balance, coordination, kinesthetic sense, posture, and proprioception for sitting or standing balance  Use mirror when performing cervical SB AROM/UT stretch for feedback  Educated on use of towel roll for cervical support when sleeping     Therapeutic Activities: to improve functional performance, power, or agility  None this date     Ambulation/Gait Training:  None this date     Activity tolerance and post treatment pain report:   Good; 7/10    Education:  Education was provided to the patient on the following topics: continue HEP in pain free ROM.   [x]    No changes were made to the home exercise program.  []    The following changes were made to the home exercise program  Patient verbalized understanding of the topics presented. ASSESSMENT:   Patient presents with continued tightness, stiffness, and pain in neck and low back. Right sided low back and neck musculature remains more tight and tender than left. He continues to experience frequent headaches. Patient with hypomobility noted in lower thoracic and lumbar spine. He continues to demonstrate trunk flexion with ambulation. Patient with regular performance of HEP. Patient educated on use of mirror at home for feedback during cervical AROM. Patient tolerated clinic exercises well with rest breaks provided and cues for form. He tolerated manual therapy well today. He remains slow with all transitions for bed mobility and transfers due to pain. He continues to experience pain with prolonged standing and sitting. Patient encouraged to get out of bus and walk once he reaches \"end of line\" at work. Patient will continue to benefit from skilled physical therapy to improve strength, ROM, activity tolerance, and mobility. Patients progression toward goals is as follows:  [x]     Improving appropriately and progressing toward goals  []     Improving slowly and progressing toward goals  []     Not making progress toward goals and plan of care will be adjusted    PLAN OF CARE:   Patient continues to benefit from skilled intervention to address the above impairments. [x]    Continue treatment per established plan of care.   []     Recommend the following changes to the plan of care    Recommendations/Intent for next treatment: TARA/BRIAN Last, PT   Time Calculation: 55 mins  Patient Time in clinic:   Start Time: 1450   Stop Time: [phone]

## 2022-09-27 ENCOUNTER — HOSPITAL ENCOUNTER (OUTPATIENT)
Dept: PHYSICAL THERAPY | Age: 62
End: 2022-09-27
Payer: MEDICAID

## 2022-09-27 NOTE — PROGRESS NOTES
[facility]  [address], [address] [facility]    OUTPATIENT PHYSICAL THERAPY      9/27/2022:  Ileana Felipe was not seen on this date for physical therapy for the following reasons:    [x]     Patient called to cancel the visit for the following reasons: schedule conflict  []     Patient missed the visit and did not call to cancel.     Avelino Nino , PTA

## 2022-10-04 ENCOUNTER — HOSPITAL ENCOUNTER (OUTPATIENT)
Dept: PHYSICAL THERAPY | Age: 62
Discharge: HOME | End: 2022-10-04
Payer: MEDICAID

## 2022-10-04 PROCEDURE — 97110 THERAPEUTIC EXERCISES: CPT | Performed by: PHYSICAL THERAPIST

## 2022-10-04 PROCEDURE — 97140 MANUAL THERAPY 1/> REGIONS: CPT | Performed by: PHYSICAL THERAPIST

## 2022-10-04 NOTE — PROGRESS NOTES
[facility]  [address], [address] [facility]    OUTPATIENT PHYSICAL THERAPY DAILY TREATMENT NOTE  VISIT: 5    NAME: Epi Coronado AGE: 60 y.o. GENDER: male  DATE: 10/4/2022  REFERRING PHYSICIAN: Shanti Ricks MD    GOALS  Short term goals  Time frame: 4 weeks  1. Patient will be compliant and independent with the initial HEP as evidenced by being able to perform without cuing. 2. Patient will report a 25% improvement in symptoms. 3. Patient report a 25% improvement in sleeping. 4. Patient will have a 20 degree increase in bilateral cervical rotation ROM to allow ease with driving. 5. Patient will have an increased tolerance for standing to allow 10 minutes of the activity before symptoms start. 6. Patient will tolerate 15 minutes of clinic activities before increase of symptoms. Long term goals  Time frame: 8 weeks  1. Patient will report pain level decrease to 2/10 to allow increased ease of movement. 2. Patient will have an improved score on the NDI outcome measure by 9 points to demonstrate an increase in functional activity tolerance. 3. Patient will be independent in final individualized HEP. 4. Patient will have an increase in bilateral cervical rotation ROM to 60 degrees to allow performance of work tasks. 5. Patient will have an increase in shoulder flexion strength to 5/5 to allow performance of work tasks. 6. Patient will return to standing without being limited by symptoms. 7. Patient will sleep 6-8 hours without being interrupted by pain. SUBJECTIVE:   \"I'm about 10% better. \"    Pain In: 9/10 neck and back    OBJECTIVE DATA SUMMARY:   Objective data from initial evaluation:   EXAMINATION/PRESENTATION/DECISION MAKING:   Pain:  Location: Neck and back, radiates into legs  Quality: aching, sharp, and throbbing  Now: 10/10   Best: 7/10  Worst:10/10  Factors that improve pain: rest, heat, medication: used and beneficial     Posture:   Slouched posture  Rounded shoulders     Strength:                                          LEFT                               RIGHT  Shoulder flexion              3+/5; pain in UT              3+/5; pain in UT     Unable to tolerate BLE resisted testing due to back pain     Range of Motion:   Cervical Spine (AROM)  Flexion               75% limited; pain at posterior neck  Extension           100% limited; pain at posterior neck  R side bend       75% limited; pain in B neck  L side bend        75% limited; pain in B neck  R rotation           15 degrees; pain in B neck  L rotation           10 degrees; pain in B neck     Lumbar Spine (AROM)  (*Measured 3rd finger from the floor)  Flexion               Hands to thighs; pain in back  Extension           To neutral; pain in back  R side bend       75% limited; pain at B back  L side bend        75% limited; pain at B back  R rotation           75% limited; pain at B back  L rotation           75% limited; pain at B back      Joint Mobility:   Unable to tolerate due to pain     Palpation:   Tender to palpation at B UT, levator scapulae, suboccipitals, SCM, rhomboids, and cervical paraspinals  Tender to palpation at B QL, gluteals, and thoracic/lumbar paraspinals     Neurologic Assessment:               Tone: Normal               Sensation: Intact to light touch; Occasional tingling in B feet reported               Reflexes: NT     Special Tests:   (+) SLR     Mobility:               Transitional Movements: Increased time and effort to perform sit to stands and bed mobility; independent                Gait: Slow pace, trunk flexion  Balance:   WNL     Functional Measure:   NDI: 39/50    TREATMENT/INTERVENTION:  Modalities (Rationale): to decrease pain and muscle guarding  MHP to neck and low back with supine exercises; no skin irritation pre or post treatment     Therapeutic Exercises: to develop strength, endurance, range of motion, and flexibility  Initial HEP provided 8/30/22 (Access Code [email or web address]): UT stretch; LTR  Added to HEP 9/13/22: SKTC with towel; supine quadriceps/hip flexor stretch; shoulder rolls      Exercises in BOLD performed this date:      Seated:   Cervical SB AROM/UT stretch: 2 sets of 5 reps B   Shoulder rolls: 2 sets of 5 reps  Cervical rotation self mob with towel: 3 reps B      Supine:   LTR: 2 sets of 5 reps B  SKTC with towel: 2 sets of 5 reps B  Cervical rotation: 5 reps B  Quadriceps/hip flexor stretch: 2 reps with 15 sec holds B   PROM hamstring stretch and piriformis stretches x3 B      Manual Therapy: for joint mobilization/manipulations and soft tissue mobilization   STM to bilateral UT, levator scapulae, and cervical paraspinals   Instrument assisted soft tissue mobilization to B UT, levator scapulae, and cervical paraspinals. Patient educated on purpose and affect of intervention with good understanding verbalized. Manual cervical distraction, suboccipital release in supine  STM to bilateral QL, TFL/IT band, hip flexors, gluteals, and thoracic/lumbar paraspinals with lacrosse ball in sidelying  Gentle trigger point release to B gluteals with hip ER/IR in prone  PA mobs, T11-L5, grade 2 in prone     Neuro Re-Education: to improve movement, balance, coordination, kinesthetic sense, posture, and proprioception for sitting or standing balance  Use mirror when performing cervical SB AROM/UT stretch for feedback  Educated on use of towel roll for cervical support when sleeping     Therapeutic Activities: to improve functional performance, power, or agility  None this date     Ambulation/Gait Training:  None this date     Activity tolerance and post treatment pain report:   Good; 7/10    Education:  Education was provided to the patient on the following topics: continue HEP in pain free ROM.   [x]    No changes were made to the home exercise program.  []    The following changes were made to the home exercise program  Patient verbalized understanding of the topics presented. ASSESSMENT:   Patient presents with continued tightness, stiffness, and pain in neck and low back. Right sided low back and neck musculature remains more tight and tender than left. He continues to experience frequent headaches. Patient with hypomobility noted in lower thoracic and lumbar spine. He continues to demonstrate trunk flexion with ambulation. Overall slow progress noted with patient reporting that he feels 10% better since starting therapy. Patient demonstrates improved PROM of cervical and lumbar regions but he remains guarded with AROM. Patient with fairly regular performance of HEP; reporting skipping a day or two per week due to pain. Patient only goes through minimal range of motion with exercises; again suggested using hot pack, warm water from shower, or mirror for feedback to help relax muscles. Patient tolerated clinic exercises well with rest breaks provided and cues for form. He tolerated manual therapy well with pain relief by end of session. He remains slow with all transitions for bed mobility and transfers due to pain. He continues to experience pain with prolonged standing and sitting. Patient will continue to benefit from skilled physical therapy to improve strength, ROM, activity tolerance, and mobility. Patients progression toward goals is as follows:  []     Improving appropriately and progressing toward goals  [x]     Improving slowly and progressing toward goals  []     Not making progress toward goals and plan of care will be adjusted    PLAN OF CARE:   Patient continues to benefit from skilled intervention to address the above impairments. [x]    Continue treatment per established plan of care.   []     Recommend the following changes to the plan of care    Recommendations/Intent for next treatment: UBE/LBE Corky Baumgarten, PT   Time Calculation: 50 mins  Patient Time in clinic:   Start Time: 3267   Stop Time: 0341

## 2022-10-06 ENCOUNTER — HOSPITAL ENCOUNTER (OUTPATIENT)
Dept: PHYSICAL THERAPY | Age: 62
Discharge: HOME | End: 2022-10-06
Payer: MEDICAID

## 2022-10-06 PROCEDURE — 97110 THERAPEUTIC EXERCISES: CPT | Performed by: PHYSICAL THERAPIST

## 2022-10-06 PROCEDURE — 97140 MANUAL THERAPY 1/> REGIONS: CPT | Performed by: PHYSICAL THERAPIST

## 2022-10-06 NOTE — PROGRESS NOTES
[facility]  [address], [address] [facility]    OUTPATIENT PHYSICAL THERAPY DAILY TREATMENT NOTE  VISIT: 6    NAME: Kristopher Dial AGE: 60 y.o. GENDER: male  DATE: 10/6/2022  REFERRING PHYSICIAN: Antonia Garcia MD    GOALS  Short term goals  Time frame: 4 weeks  1. Patient will be compliant and independent with the initial HEP as evidenced by being able to perform without cuing. 2. Patient will report a 25% improvement in symptoms. 3. Patient report a 25% improvement in sleeping. 4. Patient will have a 20 degree increase in bilateral cervical rotation ROM to allow ease with driving. 5. Patient will have an increased tolerance for standing to allow 10 minutes of the activity before symptoms start. 6. Patient will tolerate 15 minutes of clinic activities before increase of symptoms. Long term goals  Time frame: 8 weeks  1. Patient will report pain level decrease to 2/10 to allow increased ease of movement. 2. Patient will have an improved score on the NDI outcome measure by 9 points to demonstrate an increase in functional activity tolerance. 3. Patient will be independent in final individualized HEP. 4. Patient will have an increase in bilateral cervical rotation ROM to 60 degrees to allow performance of work tasks. 5. Patient will have an increase in shoulder flexion strength to 5/5 to allow performance of work tasks. 6. Patient will return to standing without being limited by symptoms. 7. Patient will sleep 6-8 hours without being interrupted by pain. SUBJECTIVE:   \"It still hurts.  I have some days where it's not as bad as it was\"    Pain In: 9-9.5/10 neck and back    OBJECTIVE DATA SUMMARY:   Objective data from initial evaluation:   EXAMINATION/PRESENTATION/DECISION MAKING:   Pain:  Location: Neck and back, radiates into legs  Quality: aching, sharp, and throbbing  Now: 10/10   Best: 7/10  Worst:10/10  Factors that improve pain: rest, heat, medication: used and beneficial     Posture:   Slouched posture  Rounded shoulders     Strength:                                          LEFT                               RIGHT  Shoulder flexion              3+/5; pain in UT              3+/5; pain in UT     Unable to tolerate BLE resisted testing due to back pain     Range of Motion:   Cervical Spine (AROM)  Flexion               75% limited; pain at posterior neck  Extension           100% limited; pain at posterior neck  R side bend       75% limited; pain in B neck  L side bend        75% limited; pain in B neck  R rotation           15 degrees; pain in B neck  L rotation           10 degrees; pain in B neck     Lumbar Spine (AROM)  (*Measured 3rd finger from the floor)  Flexion               Hands to thighs; pain in back  Extension           To neutral; pain in back  R side bend       75% limited; pain at B back  L side bend        75% limited; pain at B back  R rotation           75% limited; pain at B back  L rotation           75% limited; pain at B back      Joint Mobility:   Unable to tolerate due to pain     Palpation:   Tender to palpation at B UT, levator scapulae, suboccipitals, SCM, rhomboids, and cervical paraspinals  Tender to palpation at B QL, gluteals, and thoracic/lumbar paraspinals     Neurologic Assessment:               Tone: Normal               Sensation: Intact to light touch; Occasional tingling in B feet reported               Reflexes: NT     Special Tests:   (+) SLR     Mobility:               Transitional Movements: Increased time and effort to perform sit to stands and bed mobility; independent                Gait: Slow pace, trunk flexion  Balance:   WNL     Functional Measure:   NDI: 39/50    TREATMENT/INTERVENTION:  Modalities (Rationale): to decrease pain and muscle guarding  MHP to neck and low back with supine exercises; no skin irritation pre or post treatment     Therapeutic Exercises: to develop strength, endurance, range of motion, and flexibility  Initial HEP provided 8/30/22 (Access Code [email or web address]): UT stretch; LTR  Added to HEP 9/13/22: Lopez Orgas with towel; supine quadriceps/hip flexor stretch; shoulder rolls      Exercises in BOLD performed this date:      Seated:   Cervical SB AROM/UT stretch: 2 sets of 5 reps B   Shoulder rolls: 2 sets of 5 reps  Cervical rotation self mob with towel: 3 reps B   Half neck circles: 3 reps each direction     Supine:   LTR: 5 reps B  SKTC with towel: 5 reps B  Cervical rotation: 5 reps B  Quadriceps/hip flexor stretch: 2 reps with 15 sec holds B   PROM hamstring stretch and piriformis stretches x3 B      Manual Therapy: for joint mobilization/manipulations and soft tissue mobilization   STM to bilateral UT, levator scapulae, and cervical paraspinals   Instrument assisted soft tissue mobilization to B UT, levator scapulae, and cervical paraspinals. Patient educated on purpose and affect of intervention with good understanding verbalized. Manual cervical distraction, suboccipital release in supine  STM to bilateral QL, TFL/IT band, hip flexors, gluteals, and thoracic/lumbar paraspinals with lacrosse ball in sidelying  Gentle trigger point release to B gluteals with hip ER/IR in prone  PA mobs, T11-L5, grade 2 in prone     Neuro Re-Education: to improve movement, balance, coordination, kinesthetic sense, posture, and proprioception for sitting or standing balance  Use mirror when performing cervical SB AROM/UT stretch for feedback  Educated on use of towel roll for cervical support when sleeping     Therapeutic Activities: to improve functional performance, power, or agility  None this date     Ambulation/Gait Training:  None this date     Activity tolerance and post treatment pain report:   Good; 8/10    Education:  Education was provided to the patient on the following topics: continue HEP in pain free ROM.   [x]    No changes were made to the home exercise program.  []    The following changes were made to the home exercise program  Patient verbalized understanding of the topics presented. ASSESSMENT:   Patient presents with continued tightness, stiffness, and pain in neck and low back. Right sided low back and neck musculature remains more tight and tender than left. He continues to experience frequent headaches. Patient with hypomobility noted in lower thoracic and lumbar spine. He continues to demonstrate trunk flexion with ambulation. Patient with regular performance of HEP. Patient able to tolerate slightly more active ROM than previous session. Patient tolerated clinic exercises well with rest breaks provided and cues for form. He tolerated manual therapy well with pain relief by end of session. He remains slow with all transitions for bed mobility and transfers due to pain. He continues to experience pain with prolonged standing and sitting. Patient will continue to benefit from skilled physical therapy to improve strength, ROM, activity tolerance, and mobility. Patients progression toward goals is as follows:  []     Improving appropriately and progressing toward goals  [x]     Improving slowly and progressing toward goals  []     Not making progress toward goals and plan of care will be adjusted    PLAN OF CARE:   Patient continues to benefit from skilled intervention to address the above impairments. [x]    Continue treatment per established plan of care.   []     Recommend the following changes to the plan of care    Recommendations/Intent for next treatment: progress as able    Erlindaria Better, PT   Time Calculation: 49 mins  Patient Time in clinic:   Start Time: 1500   Stop Time: [phone]

## 2022-10-11 ENCOUNTER — HOSPITAL ENCOUNTER (OUTPATIENT)
Dept: PHYSICAL THERAPY | Age: 62
Discharge: HOME | End: 2022-10-11
Payer: MEDICAID

## 2022-10-11 NOTE — PROGRESS NOTES
[facility]  [address], [address] [facility]    OUTPATIENT PHYSICAL THERAPY      10/11/2022:  Trever Haynesdanniemaikel was not seen on this date for physical therapy for the following reasons:    [x]     Patient called to cancel the visit for the following reasons: work conflict  []     Patient missed the visit and did not call to cancel.     Miko Godfrey, PT

## 2022-10-18 ENCOUNTER — HOSPITAL ENCOUNTER (OUTPATIENT)
Dept: PHYSICAL THERAPY | Age: 62
Discharge: HOME | End: 2022-10-18
Payer: MEDICAID

## 2022-10-18 NOTE — PROGRESS NOTES
[facility]  [address], [address] [facility]    OUTPATIENT PHYSICAL THERAPY      10/18/2022:  Manniekaleigh Queen was not seen on this date for physical therapy for the following reasons:    [x]     Patient called to cancel the visit for the following reasons: schedule  []     Patient missed the visit and did not call to cancel.     Gerri Antunez, PTA

## 2022-10-25 ENCOUNTER — HOSPITAL ENCOUNTER (OUTPATIENT)
Dept: PHYSICAL THERAPY | Age: 62
Discharge: HOME | End: 2022-10-25
Payer: MEDICAID

## 2022-10-25 NOTE — PROGRESS NOTES
[facility]  [address], [address] [facility]    OUTPATIENT PHYSICAL THERAPY      10/25/2022:  Jose Manley was not seen on this date for physical therapy for the following reasons:    [x]     Patient called to cancel the visit for the following reasons: work conflict  []     Patient missed the visit and did not call to cancel.     Verna Bahena, PT

## 2022-10-25 NOTE — PROGRESS NOTES
[address], [address] [place] [address]    OUTPATIENT physical Therapy discharge note      10/25/2022:  Patient will be discharged from physical therapy at this time. Criteria for termination of care:    [x]           Patient unable to attend final PT session secondary to work conflict. Patient requested to conclude physical therapy as his work schedule is changing and he will be unable to attend afternoon sessions. Patient provided with comprehensive home exercise program and instructed to continue his exercises 2 times per day. Patient instructed to call with questions as needed. Patient was seen for 6 visits from 8/30/22 to 10/6/22. Please refer to the most recent progress note for the latest PT info available. If you need anything further faxed to you, please contact us at [phone].     Thank you for this referral.  Verna Bahena, PT

## 2022-10-26 ENCOUNTER — TELEPHONE (OUTPATIENT)
Dept: INTERNAL MEDICINE CLINIC | Age: 62
End: 2022-10-26

## 2022-10-26 DIAGNOSIS — A59.9 TRICHOMONAL INFECTION: Primary | ICD-10-CM

## 2022-10-26 LAB
C TRACH RRNA SPEC QL NAA+PROBE: NEGATIVE
CHOLEST SERPL-MCNC: 148 MG/DL (ref 100–199)
HCV AB S/CO SERPL IA: <0.1 S/CO RATIO (ref 0–0.9)
HCV AB SERPL QL IA: NORMAL
HDLC SERPL-MCNC: 61 MG/DL
HIV 1+2 AB+HIV1 P24 AG SERPL QL IA: NON REACTIVE
IMP & REVIEW OF LAB RESULTS: NORMAL
LDLC SERPL CALC-MCNC: 66 MG/DL (ref 0–99)
N GONORRHOEA RRNA SPEC QL NAA+PROBE: NEGATIVE
T VAGINALIS RRNA SPEC QL NAA+PROBE: POSITIVE
TRIGL SERPL-MCNC: 117 MG/DL (ref 0–149)
VLDLC SERPL CALC-MCNC: 21 MG/DL (ref 5–40)

## 2022-10-26 RX ORDER — METRONIDAZOLE 500 MG/1
500 TABLET ORAL 2 TIMES DAILY
Qty: 14 TABLET | Refills: 0 | Status: SHIPPED | OUTPATIENT
Start: 2022-10-26

## 2022-10-26 NOTE — TELEPHONE ENCOUNTER
Patient did return call, he states that he did get the medicine and knows what it's for, but he may needs a letter for about three day because of the side affect of dizziness, any question please give him a call back or message thought cathy

## 2022-10-26 NOTE — TELEPHONE ENCOUNTER
Pt is returning your call.  He states he needs a note for his job due to medication making him dizzy( he will start take the medicine tonight)  Pt # [phone]

## 2022-11-09 ENCOUNTER — TELEPHONE (OUTPATIENT)
Dept: INTERNAL MEDICINE CLINIC | Age: 62
End: 2022-11-09

## 2022-11-09 ENCOUNTER — OFFICE VISIT (OUTPATIENT)
Dept: INTERNAL MEDICINE CLINIC | Age: 62
End: 2022-11-09
Payer: MEDICAID

## 2022-11-09 VITALS
DIASTOLIC BLOOD PRESSURE: 93 MMHG | OXYGEN SATURATION: 98 % | BODY MASS INDEX: 26.88 KG/M2 | WEIGHT: 192 LBS | HEART RATE: 86 BPM | TEMPERATURE: 97.6 F | HEIGHT: 71 IN | SYSTOLIC BLOOD PRESSURE: 161 MMHG | RESPIRATION RATE: 18 BRPM

## 2022-11-09 DIAGNOSIS — I10 PRIMARY HYPERTENSION: ICD-10-CM

## 2022-11-09 DIAGNOSIS — J84.9 ILD (INTERSTITIAL LUNG DISEASE) (HCC): Primary | ICD-10-CM

## 2022-11-09 PROCEDURE — 99213 OFFICE O/P EST LOW 20 MIN: CPT | Performed by: INTERNAL MEDICINE

## 2022-11-09 RX ORDER — BENZONATATE 100 MG/1
100 CAPSULE ORAL
Qty: 21 CAPSULE | Refills: 1 | Status: SHIPPED | OUTPATIENT
Start: 2022-11-09 | End: 2022-11-16

## 2022-11-09 RX ORDER — AMLODIPINE BESYLATE 10 MG/1
TABLET ORAL
Qty: 30 TABLET | Refills: 2 | OUTPATIENT
Start: 2022-11-09

## 2022-11-09 NOTE — TELEPHONE ENCOUNTER
Writer sent pt a message via [facility]  -----------------------------------------------------  Duplicate request: Norvasc 10 mg #30 with 2 refills was sent to [facility] on 09/12/2022.      For Pharmacy Admin Tracking Only    Intervention Detail: Discontinued Rx: 1, reason: Duplicate Therapy  Time Spent (min): 5      Requested Prescriptions     Pending Prescriptions Disp Refills    amLODIPine (NORVASC) 10 mg tablet 30 Tablet 2

## 2022-11-09 NOTE — TELEPHONE ENCOUNTER
Pt called today stating you forgot to send rx to  [facility] for the Methylprednisolone.   Pt @ [phone]

## 2022-11-09 NOTE — PROGRESS NOTES
Jose Manley is a 60 y.o. male and presents with Cough (Chronic cough )  . Subjective:    Pt has known ILD. He recently saw pulm and ct scan chest ordered. Pt requests refill tessalon. Pt is in a rush. He has a court appt in <30 minutes. PMH- HTN- on amlodipine 10mg     BP Readings from Last 3 Encounters:   22 (!) 161/93   22 (!) 149/100   22 131/82      Hyperlipidemia- on atorvastatin 10mg  Lab Results   Component Value Date/Time    Cholesterol, total 148 10/06/2022 02:55 PM    HDL Cholesterol 61 10/06/2022 02:55 PM    LDL, calculated 66 10/06/2022 02:55 PM    LDL, calculated 114.4 (H) 2021 10:04 AM    VLDL, calculated 21 10/06/2022 02:55 PM    VLDL, calculated 22.6 2021 10:04 AM    Triglyceride 117 10/06/2022 02:55 PM    CHOL/HDL Ratio 3.2 2021 10:04 AM     Lab Results   Component Value Date/Time    ALT (SGPT) 14 2021 10:04 AM    AST (SGOT) 12 (L) 2021 10:04 AM    Alk. phosphatase 52 2021 10:04 AM    Bilirubin, total 1.1 (H) 2021 10:04 AM      Chronic lbp/OA   Abn CXR- pulm recommended ct scan      PSH-none    SH- single   Self employed   No tob, occas alcohol, no illicit drugs   Lives alone no children    FH- father  ? ? Mother  CAD, HTN   1 brother HTN    HM    Colonoscopy ~ 5 yrs ago in [address] as per pt  Immunizations declines flu vaccine  Eye care UTD  Dental care ~ 1 mth ago          Review of Systems  Constitutional: negative for fevers, chills, anorexia and weight loss  Eyes:   negative for visual disturbance and irritation  ENT:   negative for tinnitus,sore throat,nasal congestion,ear pains. hoarseness  Respiratory:  negative for cough, hemoptysis, dyspnea,wheezing  CV:   negative for chest pain, palpitations, lower extremity edema  GI:   negative for nausea, vomiting, diarrhea, abdominal pain,melena  Endo:               negative for polyuria,polydipsia,polyphagia,heat intolerance  Musculoskel: positive for  arthralgias, back pain,  joint pain  Neurological:  negative for headaches, dizziness, vertigo, memory problems and gait   Behavl/Psych: negative for feelings of anxiety, depression, mood changes    Past Medical History:   Diagnosis Date    Hypercholesterolemia     Hypertension      No past surgical history on file. Social History     Socioeconomic History    Marital status:    Tobacco Use    Smoking status: Every Day     Packs/day: 0.25     Years: 2.00     Pack years: 0.50     Types: Cigarettes, Cigars     Last attempt to quit: 2000     Years since quittin.2    Smokeless tobacco: Never   Vaping Use    Vaping Use: Never used   Substance and Sexual Activity    Alcohol use: Yes     Comment: social drinker    Drug use: Never    Sexual activity: Yes     Partners: Female     Birth control/protection: Condom     No family history on file. Current Outpatient Medications   Medication Sig Dispense Refill    benzonatate (TESSALON) 100 mg capsule Take 1 Capsule by mouth three (3) times daily as needed for Cough for up to 7 days. 21 Capsule 1    metroNIDAZOLE (FLAGYL) 500 mg tablet Take 1 Tablet by mouth two (2) times a day. 14 Tablet 0    amLODIPine (NORVASC) 10 mg tablet TAKE 1 TABLET BY MOUTH EVERY DAY, TAKE THE PLACE OF LISINOPRIL 30 Tablet 2    atorvastatin (LIPITOR) 10 mg tablet Take 1 Tablet by mouth daily. 90 Tablet 1     Not on File    Objective:  Visit Vitals  BP (!) 161/93 (BP 1 Location: Right upper arm, BP Patient Position: Sitting, BP Cuff Size: Adult)   Pulse 86   Temp 97.6 °F (36.4 °C) (Temporal)   Resp 18   Ht 5' 11\" (1.803 m)   Wt 192 lb (87.1 kg)   SpO2 98%   BMI 26.78 kg/m²     Physical Exam:   General appearance - alert, well appearing, and in no distress   Mental status - alert, oriented to person, place, and time  EYE-EOMI  Neck - supple,   Chest - symmetric air entry    Ext-no pedal edema, no clubbing or cyanosis  Skin-Warm and dry.  no hyperpigmentation, vitiligo, or suspicious lesions  Neuro -alert, oriented, normal speech, no focal findings or movement disorder noted        Results for orders placed or performed in visit on 10/06/22   LIPID PANEL   Result Value Ref Range    Cholesterol, total 148 100 - 199 mg/dL    Triglyceride 117 0 - 149 mg/dL    HDL Cholesterol 61 >39 mg/dL    VLDL, calculated 21 5 - 40 mg/dL    LDL, calculated 66 0 - 99 mg/dL   CHLAMYDIA / GC-AMPLIFIED   Result Value Ref Range    Chlamydia trachomatis, SAMANTHA Negative Negative    Neisseria gonorrhoeae, SAMANTHA Negative Negative   HEPATITIS C AB, RFLX TO QT BY PCR   Result Value Ref Range    HCV Ab <0.1 0.0 - 0.9 s/co ratio   HCV INTERPRETATION   Result Value Ref Range    HCV Interpretation Comment    T VAGINALIS AMPLIFICATION   Result Value Ref Range    T. vaginalis by SAMANTHA Positive (A) Negative   HIV 1/2 AG/AB, 4TH GENERATION,W RFLX CONFIRM   Result Value Ref Range    HIV SCREEN 4TH GENERATION WRFX Non Reactive Non Reactive   CVD REPORT   Result Value Ref Range    INTERPRETATION Note        Assessment/Plan:    ICD-10-CM ICD-9-CM    1. ILD (interstitial lung disease) (HCC)  J84.9 515 benzonatate (TESSALON) 100 mg capsule            Orders Placed This Encounter    benzonatate (TESSALON) 100 mg capsule     Sig: Take 1 Capsule by mouth three (3) times daily as needed for Cough for up to 7 days. Dispense:  21 Capsule     Refill:  1           There are no Patient Instructions on file for this visit. Follow-up and Dispositions    Return if symptoms worsen or fail to improve. I have reviewed with the patient details of the assessment and plan and all questions were answered. Relevent patient education was performed. The most recent lab findings were reviewed with the patient. An After Visit Summary was printed and given to the patient.

## 2022-11-09 NOTE — TELEPHONE ENCOUNTER
Pt was informed per your instructions that he needs to follow up with the pulmonologist.  He understood.

## 2022-12-21 DIAGNOSIS — E78.5 HYPERLIPIDEMIA, UNSPECIFIED HYPERLIPIDEMIA TYPE: ICD-10-CM

## 2022-12-21 RX ORDER — ATORVASTATIN CALCIUM 10 MG/1
10 TABLET, FILM COATED ORAL DAILY
Qty: 90 TABLET | Refills: 3 | Status: SHIPPED | OUTPATIENT
Start: 2022-12-21

## 2023-01-03 RX ORDER — MOMETASONE FUROATE 50 UG/1
2 SPRAY, METERED NASAL
Qty: 1 EACH | Refills: 5 | Status: SHIPPED | OUTPATIENT
Start: 2023-01-03

## 2023-02-21 ENCOUNTER — OFFICE VISIT (OUTPATIENT)
Dept: NEUROLOGY | Age: 63
End: 2023-02-21
Payer: MEDICAID

## 2023-02-21 VITALS
OXYGEN SATURATION: 97 % | WEIGHT: 207.4 LBS | TEMPERATURE: 98 F | HEART RATE: 85 BPM | RESPIRATION RATE: 18 BRPM | BODY MASS INDEX: 29.03 KG/M2 | SYSTOLIC BLOOD PRESSURE: 136 MMHG | DIASTOLIC BLOOD PRESSURE: 84 MMHG | HEIGHT: 71 IN

## 2023-02-21 DIAGNOSIS — F07.81 POST CONCUSSIVE SYNDROME: Primary | ICD-10-CM

## 2023-02-21 PROCEDURE — 99205 OFFICE O/P NEW HI 60 MIN: CPT | Performed by: INTERNAL MEDICINE

## 2023-02-21 PROCEDURE — 3079F DIAST BP 80-89 MM HG: CPT | Performed by: INTERNAL MEDICINE

## 2023-02-21 PROCEDURE — 3075F SYST BP GE 130 - 139MM HG: CPT | Performed by: INTERNAL MEDICINE

## 2023-02-21 RX ORDER — AMITRIPTYLINE HYDROCHLORIDE 25 MG/1
25 TABLET, FILM COATED ORAL
Qty: 60 TABLET | Refills: 3 | Status: SHIPPED | OUTPATIENT
Start: 2023-02-21

## 2023-02-21 RX ORDER — TIZANIDINE 4 MG/1
4 TABLET ORAL
Qty: 30 TABLET | Refills: 2 | Status: SHIPPED | OUTPATIENT
Start: 2023-02-21

## 2023-02-21 RX ORDER — PREDNISONE 10 MG/1
10 TABLET ORAL DAILY
COMMUNITY
Start: 2023-02-02
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
## 2023-06-05 RX ORDER — TIZANIDINE 4 MG/1
4 TABLET ORAL NIGHTLY PRN
Qty: 30 TABLET | Refills: 2 | Status: SHIPPED | OUTPATIENT
Start: 2023-06-05

## 2023-06-05 NOTE — TELEPHONE ENCOUNTER
Received fax from [facility] requesting refill on tizanidine.  Last OV was 2/21/23  Last refilled on 2/21/23 # 30 with 2 refills

## 2023-06-22 DIAGNOSIS — I10 ESSENTIAL (PRIMARY) HYPERTENSION: ICD-10-CM

## 2023-06-23 RX ORDER — AMLODIPINE BESYLATE 10 MG/1
TABLET ORAL
Qty: 90 TABLET | Refills: 1 | Status: SHIPPED | OUTPATIENT
Start: 2023-06-23

## 2023-07-15 DIAGNOSIS — J84.9 INTERSTITIAL PULMONARY DISEASE, UNSPECIFIED (HCC): ICD-10-CM

## 2023-07-21 RX ORDER — BENZONATATE 100 MG/1
CAPSULE ORAL
Qty: 21 CAPSULE | Refills: 1 | OUTPATIENT
Start: 2023-07-21

## 2023-10-18 NOTE — TELEPHONE ENCOUNTER
Received fax from [facility] requesting refill on Tizanidine. Last OV was 2/21/23 -next is scheduled for 1/19/24 with you.  Last refilled on 6/5/23

## 2023-10-19 RX ORDER — TIZANIDINE 4 MG/1
4 TABLET ORAL NIGHTLY PRN
Qty: 30 TABLET | Refills: 2 | Status: SHIPPED | OUTPATIENT
Start: 2023-10-19

## 2023-12-05 DIAGNOSIS — I10 ESSENTIAL (PRIMARY) HYPERTENSION: ICD-10-CM

## 2023-12-05 RX ORDER — AMLODIPINE BESYLATE 10 MG/1
TABLET ORAL
Qty: 30 TABLET | Refills: 0 | Status: SHIPPED | OUTPATIENT
Start: 2023-12-05

## 2023-12-06 ENCOUNTER — OFFICE VISIT (OUTPATIENT)
Facility: CLINIC | Age: 63
End: 2023-12-06
Payer: MEDICAID

## 2023-12-06 VITALS
HEIGHT: 71 IN | OXYGEN SATURATION: 96 % | RESPIRATION RATE: 18 BRPM | SYSTOLIC BLOOD PRESSURE: 128 MMHG | BODY MASS INDEX: 30.24 KG/M2 | WEIGHT: 216 LBS | DIASTOLIC BLOOD PRESSURE: 72 MMHG | TEMPERATURE: 97.9 F | HEART RATE: 74 BPM

## 2023-12-06 DIAGNOSIS — I10 PRIMARY HYPERTENSION: Primary | ICD-10-CM

## 2023-12-06 DIAGNOSIS — M34.9 SYSTEMIC SCLEROSIS (HCC): ICD-10-CM

## 2023-12-06 DIAGNOSIS — Z11.3 ROUTINE SCREENING FOR STI (SEXUALLY TRANSMITTED INFECTION): ICD-10-CM

## 2023-12-06 DIAGNOSIS — I10 ESSENTIAL (PRIMARY) HYPERTENSION: ICD-10-CM

## 2023-12-06 DIAGNOSIS — Z12.5 PROSTATE CANCER SCREENING: ICD-10-CM

## 2023-12-06 DIAGNOSIS — E78.5 HYPERLIPIDEMIA, UNSPECIFIED HYPERLIPIDEMIA TYPE: ICD-10-CM

## 2023-12-06 DIAGNOSIS — Z12.11 COLON CANCER SCREENING: ICD-10-CM

## 2023-12-06 DIAGNOSIS — J84.9 INTERSTITIAL PULMONARY DISEASE, UNSPECIFIED (HCC): ICD-10-CM

## 2023-12-06 LAB
ALBUMIN SERPL-MCNC: 3.8 G/DL (ref 3.5–5)
ALBUMIN/GLOB SERPL: 1.1 (ref 1.1–2.2)
ALP SERPL-CCNC: 57 U/L (ref 45–117)
ALT SERPL-CCNC: 16 U/L (ref 12–78)
ANION GAP SERPL CALC-SCNC: 3 MMOL/L (ref 5–15)
AST SERPL-CCNC: 8 U/L (ref 15–37)
BASOPHILS # BLD: 0.1 K/UL (ref 0–0.1)
BASOPHILS NFR BLD: 1 % (ref 0–1)
BILIRUB SERPL-MCNC: 1.1 MG/DL (ref 0.2–1)
BUN SERPL-MCNC: 14 MG/DL (ref 6–20)
BUN/CREAT SERPL: 12 (ref 12–20)
CALCIUM SERPL-MCNC: 9 MG/DL (ref 8.5–10.1)
CHLORIDE SERPL-SCNC: 108 MMOL/L (ref 97–108)
CHOLEST SERPL-MCNC: 145 MG/DL
CO2 SERPL-SCNC: 31 MMOL/L (ref 21–32)
CREAT SERPL-MCNC: 1.18 MG/DL (ref 0.7–1.3)
DIFFERENTIAL METHOD BLD: ABNORMAL
EOSINOPHIL # BLD: 0.1 K/UL (ref 0–0.4)
EOSINOPHIL NFR BLD: 1 % (ref 0–7)
ERYTHROCYTE [DISTWIDTH] IN BLOOD BY AUTOMATED COUNT: 16 % (ref 11.5–14.5)
EST. AVERAGE GLUCOSE BLD GHB EST-MCNC: 103 MG/DL
GLOBULIN SER CALC-MCNC: 3.6 G/DL (ref 2–4)
GLUCOSE SERPL-MCNC: 98 MG/DL (ref 65–100)
HBA1C MFR BLD: 5.2 % (ref 4–5.6)
HCT VFR BLD AUTO: 39.9 % (ref 36.6–50.3)
HDLC SERPL-MCNC: 59 MG/DL
HDLC SERPL: 2.5 (ref 0–5)
HGB BLD-MCNC: 12.9 G/DL (ref 12.1–17)
HIV 1+2 AB+HIV1 P24 AG SERPL QL IA: NONREACTIVE
HIV 1/2 RESULT COMMENT: NORMAL
IMM GRANULOCYTES # BLD AUTO: 0 K/UL (ref 0–0.04)
IMM GRANULOCYTES NFR BLD AUTO: 0 % (ref 0–0.5)
LDLC SERPL CALC-MCNC: 70.4 MG/DL (ref 0–100)
LYMPHOCYTES # BLD: 4.5 K/UL (ref 0.8–3.5)
LYMPHOCYTES NFR BLD: 40 % (ref 12–49)
MCH RBC QN AUTO: 29.2 PG (ref 26–34)
MCHC RBC AUTO-ENTMCNC: 32.3 G/DL (ref 30–36.5)
MCV RBC AUTO: 90.3 FL (ref 80–99)
MONOCYTES # BLD: 0.7 K/UL (ref 0–1)
MONOCYTES NFR BLD: 6 % (ref 5–13)
NEUTS SEG # BLD: 6 K/UL (ref 1.8–8)
NEUTS SEG NFR BLD: 52 % (ref 32–75)
NRBC # BLD: 0.03 K/UL (ref 0–0.01)
NRBC BLD-RTO: 0.3 PER 100 WBC
PLATELET # BLD AUTO: 235 K/UL (ref 150–400)
PMV BLD AUTO: 11.7 FL (ref 8.9–12.9)
POTASSIUM SERPL-SCNC: 3.6 MMOL/L (ref 3.5–5.1)
PROT SERPL-MCNC: 7.4 G/DL (ref 6.4–8.2)
PSA SERPL-MCNC: 4.2 NG/ML (ref 0.01–4)
RBC # BLD AUTO: 4.42 M/UL (ref 4.1–5.7)
SODIUM SERPL-SCNC: 142 MMOL/L (ref 136–145)
TRIGL SERPL-MCNC: 78 MG/DL
VLDLC SERPL CALC-MCNC: 15.6 MG/DL
WBC # BLD AUTO: 11.3 K/UL (ref 4.1–11.1)

## 2023-12-06 PROCEDURE — 99214 OFFICE O/P EST MOD 30 MIN: CPT | Performed by: INTERNAL MEDICINE

## 2023-12-06 PROCEDURE — 3074F SYST BP LT 130 MM HG: CPT | Performed by: INTERNAL MEDICINE

## 2023-12-06 PROCEDURE — 3078F DIAST BP <80 MM HG: CPT | Performed by: INTERNAL MEDICINE

## 2023-12-06 RX ORDER — MYCOPHENOLATE MOFETIL 500 MG/1
1500 TABLET ORAL 2 TIMES DAILY
Qty: 60 TABLET | Refills: 3
Start: 2023-12-06

## 2023-12-06 RX ORDER — BENZONATATE 200 MG/1
CAPSULE ORAL
COMMUNITY
Start: 2023-11-17

## 2023-12-06 NOTE — PROGRESS NOTES
Cinthia Stern is a 60 y.o. male and presents with   Chief Complaint   Patient presents with    Hypertension    Follow-up              . Subjective:      PMH- HTN- on amlodipine 10mg     BP Readings from Last 3 Encounters:   23 128/72   23 136/84   22 (!) 161/93       Hyperlipidemia- on atorvastatin 10mg  Lab Results   Component Value Date    CHOL 148 10/06/2022    TRIG 117 10/06/2022    HDL 61 10/06/2022    LDLCALC 66 10/06/2022    LABVLDL 22.6 2021    VLDL 21 10/06/2022    CHOLHDLRATIO 3.2 2021      Lab Results   Component Value Date    ALT 14 2021    AST 12 (L) 2021    ALKPHOS 52 2021    BILITOT 1.1 (H) 2021         Chronic lbp/OA   ILD- followed by pulm      PSH-none    SH- single   Self employed   No tob, occas alcohol, no illicit drugs   Lives alone no children    FH- father  ? ? Mother  CAD, HTN   1 brother HTN    HM    Colonoscopy ~ 5 yrs ago in [address] as per pt  Immunizations declines flu vaccine  Eye care UTD  Dental care ~ 1 mth ago          Review of Systems  Constitutional: negative for fevers, chills, anorexia and weight loss  Eyes:   negative for visual disturbance and irritation  ENT:   negative for tinnitus,sore throat,nasal congestion,ear pains. hoarseness  Respiratory:  negative for cough, hemoptysis, dyspnea,wheezing  CV:   negative for chest pain, palpitations, lower extremity edema  GI:   negative for nausea, vomiting, diarrhea, abdominal pain,melena  Endo:               negative for polyuria,polydipsia,polyphagia,heat intolerance  Musculoskel: positive for  arthralgias, back pain,  joint pain  Neurological:  negative for headaches, dizziness, vertigo, memory problems and gait   Behavl/Psych: negative for feelings of anxiety, depression, mood changes    Past Medical History:   Diagnosis Date    Hypercholesterolemia     Hypertension      No past surgical history on file.   Social History     Socioeconomic History    Marital

## 2023-12-08 LAB
C TRACH RRNA SPEC QL NAA+PROBE: NEGATIVE
N GONORRHOEA RRNA SPEC QL NAA+PROBE: NEGATIVE
SPECIMEN SOURCE: NORMAL
T VAGINALIS RRNA SPEC QL NAA+PROBE: NEGATIVE

## 2024-01-10 DIAGNOSIS — I10 ESSENTIAL (PRIMARY) HYPERTENSION: ICD-10-CM

## 2024-01-10 DIAGNOSIS — E78.5 HYPERLIPIDEMIA, UNSPECIFIED: ICD-10-CM

## 2024-01-10 RX ORDER — ATORVASTATIN CALCIUM 10 MG/1
10 TABLET, FILM COATED ORAL DAILY
Qty: 90 TABLET | Refills: 3 | Status: SHIPPED | OUTPATIENT
Start: 2024-01-10

## 2024-01-10 RX ORDER — AMLODIPINE BESYLATE 10 MG/1
TABLET ORAL
Qty: 90 TABLET | Refills: 3 | Status: SHIPPED | OUTPATIENT
Start: 2024-01-10

## 2024-01-31 ENCOUNTER — TELEMEDICINE (OUTPATIENT)
Age: 64
End: 2024-01-31
Payer: MEDICAID

## 2024-01-31 DIAGNOSIS — F07.81 POSTCONCUSSIONAL SYNDROME: Primary | ICD-10-CM

## 2024-01-31 PROCEDURE — 99213 OFFICE O/P EST LOW 20 MIN: CPT | Performed by: NURSE PRACTITIONER

## 2024-01-31 RX ORDER — AMITRIPTYLINE HYDROCHLORIDE 25 MG/1
25 TABLET, FILM COATED ORAL NIGHTLY
Qty: 90 TABLET | Refills: 3 | Status: SHIPPED | OUTPATIENT
Start: 2024-01-31

## 2024-01-31 RX ORDER — TIZANIDINE 4 MG/1
4 TABLET ORAL NIGHTLY PRN
Qty: 10 TABLET | Refills: 5 | Status: SHIPPED | OUTPATIENT
Start: 2024-01-31
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
## 2024-07-23 ENCOUNTER — OFFICE VISIT (OUTPATIENT)
Facility: CLINIC | Age: 64
End: 2024-07-23
Payer: MEDICAID

## 2024-07-23 VITALS
BODY MASS INDEX: 30.34 KG/M2 | HEIGHT: 71 IN | TEMPERATURE: 98.4 F | RESPIRATION RATE: 18 BRPM | OXYGEN SATURATION: 96 % | HEART RATE: 76 BPM | DIASTOLIC BLOOD PRESSURE: 84 MMHG | SYSTOLIC BLOOD PRESSURE: 133 MMHG | WEIGHT: 216.7 LBS

## 2024-07-23 DIAGNOSIS — J84.9 INTERSTITIAL PULMONARY DISEASE, UNSPECIFIED (HCC): ICD-10-CM

## 2024-07-23 DIAGNOSIS — D84.821 IMMUNOSUPPRESSION DUE TO DRUG THERAPY (HCC): ICD-10-CM

## 2024-07-23 DIAGNOSIS — Z11.3 ROUTINE SCREENING FOR STI (SEXUALLY TRANSMITTED INFECTION): ICD-10-CM

## 2024-07-23 DIAGNOSIS — M34.9 SYSTEMIC SCLEROSIS (HCC): ICD-10-CM

## 2024-07-23 DIAGNOSIS — E78.49 OTHER HYPERLIPIDEMIA: ICD-10-CM

## 2024-07-23 DIAGNOSIS — R97.20 ELEVATED PSA, LESS THAN 10 NG/ML: ICD-10-CM

## 2024-07-23 DIAGNOSIS — I10 PRIMARY HYPERTENSION: ICD-10-CM

## 2024-07-23 DIAGNOSIS — I10 PRIMARY HYPERTENSION: Primary | ICD-10-CM

## 2024-07-23 DIAGNOSIS — Z79.899 IMMUNOSUPPRESSION DUE TO DRUG THERAPY (HCC): ICD-10-CM

## 2024-07-23 PROBLEM — R93.89 ABNORMAL CHEST X-RAY: Status: RESOLVED | Noted: 2022-06-21 | Resolved: 2024-07-23

## 2024-07-23 PROBLEM — F07.81 POSTCONCUSSIONAL SYNDROME: Status: RESOLVED | Noted: 2024-01-31 | Resolved: 2024-07-23

## 2024-07-23 LAB
COMMENT:: NORMAL
SPECIMEN HOLD: NORMAL

## 2024-07-23 PROCEDURE — 3075F SYST BP GE 130 - 139MM HG: CPT | Performed by: INTERNAL MEDICINE

## 2024-07-23 PROCEDURE — 1123F ACP DISCUSS/DSCN MKR DOCD: CPT | Performed by: INTERNAL MEDICINE

## 2024-07-23 PROCEDURE — 3079F DIAST BP 80-89 MM HG: CPT | Performed by: INTERNAL MEDICINE

## 2024-07-23 PROCEDURE — 99214 OFFICE O/P EST MOD 30 MIN: CPT | Performed by: INTERNAL MEDICINE

## 2024-07-23 NOTE — PROGRESS NOTES
Alex Gar is a 64 y.o. male and presents with   Chief Complaint   Patient presents with    Annual Exam       .  Subjective:      PMH- HTN- on amlodipine 10mg     BP Readings from Last 3 Encounters:   24 133/84   23 128/72   23 136/84       Hyperlipidemia- on atorvastatin 10mg  Lab Results   Component Value Date    CHOL 145 2023    TRIG 78 2023    HDL 59 2023    VLDL 15.6 2023    CHOLHDLRATIO 2.5 2023      Lab Results   Component Value Date    ALT 16 2023    AST 8 (L) 2023    ALKPHOS 57 2023    BILITOT 1.1 (H) 2023       Elevated PSA  Lab Results   Component Value Date    PSA 4.2 (H) 2023       Chronic lbp/OA     ILD/systemic sclerosis- followed by pulm @ [facility]    -on cellcept     GERD- quiescent      PSH-none    SH- single   Self employed   No tob, occas alcohol, no illicit drugs   Lives alone no children    FH- father  ??   Mother  CAD, HTN   1 brother HTN    HM    Colonoscopy ~ 5 yrs ago in [place] as per pt  Immunizations declines flu vaccine  Eye care UTD  Dental care ~ 1 mth ago          Review of Systems  Constitutional: negative for fevers, chills, anorexia and weight loss  Eyes:   negative for visual disturbance and irritation  ENT:   negative for tinnitus,sore throat,nasal congestion,ear pains.hoarseness  Respiratory:  negative for cough, hemoptysis, dyspnea,wheezing  CV:   negative for chest pain, palpitations, lower extremity edema  GI:   negative for nausea, vomiting, diarrhea, abdominal pain,melena  Endo:               negative for polyuria,polydipsia,polyphagia,heat intolerance  Musculoskel: positive for  arthralgias, back pain,  joint pain  Neurological:  negative for headaches, dizziness, vertigo, memory problems and gait   Behavl/Psych: negative for feelings of anxiety, depression, mood changes    Past Medical History:   Diagnosis Date    Hypercholesterolemia     Hypertension      No past surgical history on

## 2024-07-23 NOTE — PROGRESS NOTES
Chief Complaint   Patient presents with    Annual Exam     \"Have you been to the ER, urgent care clinic since your last visit?  Hospitalized since your last visit?\"    NO    “Have you seen or consulted any other health care providers outside of [facility] since your last visit?”    NO        “Have you had a colorectal cancer screening such as a colonoscopy/FIT/Cologuard?    NO    No colonoscopy on file  No cologuard on file  No FIT/FOBT on file   No flexible sigmoidoscopy on file         Click Here for Release of Records Request  HIPPA confirmed by two patient identifiers.

## 2024-07-24 LAB
ALBUMIN SERPL-MCNC: 3.6 G/DL (ref 3.5–5)
ALBUMIN/GLOB SERPL: 1 (ref 1.1–2.2)
ALP SERPL-CCNC: 61 U/L (ref 45–117)
ALT SERPL-CCNC: 22 U/L (ref 12–78)
ANION GAP SERPL CALC-SCNC: 6 MMOL/L (ref 5–15)
AST SERPL-CCNC: 10 U/L (ref 15–37)
BASOPHILS # BLD: 0.1 K/UL (ref 0–0.1)
BASOPHILS NFR BLD: 1 % (ref 0–1)
BILIRUB SERPL-MCNC: 1.1 MG/DL (ref 0.2–1)
BUN SERPL-MCNC: 17 MG/DL (ref 6–20)
BUN/CREAT SERPL: 15 (ref 12–20)
CALCIUM SERPL-MCNC: 9.4 MG/DL (ref 8.5–10.1)
CHLORIDE SERPL-SCNC: 107 MMOL/L (ref 97–108)
CHOLEST SERPL-MCNC: 165 MG/DL
CO2 SERPL-SCNC: 29 MMOL/L (ref 21–32)
CREAT SERPL-MCNC: 1.12 MG/DL (ref 0.7–1.3)
DIFFERENTIAL METHOD BLD: ABNORMAL
EOSINOPHIL # BLD: 0.2 K/UL (ref 0–0.4)
EOSINOPHIL NFR BLD: 2 % (ref 0–7)
ERYTHROCYTE [DISTWIDTH] IN BLOOD BY AUTOMATED COUNT: 16 % (ref 11.5–14.5)
GLOBULIN SER CALC-MCNC: 3.6 G/DL (ref 2–4)
GLUCOSE SERPL-MCNC: 105 MG/DL (ref 65–100)
HCT VFR BLD AUTO: 41.2 % (ref 36.6–50.3)
HDLC SERPL-MCNC: 55 MG/DL
HDLC SERPL: 3 (ref 0–5)
HGB BLD-MCNC: 13.2 G/DL (ref 12.1–17)
HIV 1+2 AB+HIV1 P24 AG SERPL QL IA: NONREACTIVE
HIV 1/2 RESULT COMMENT: NORMAL
IMM GRANULOCYTES # BLD AUTO: 0 K/UL
IMM GRANULOCYTES NFR BLD AUTO: 0 %
LDLC SERPL CALC-MCNC: 76 MG/DL (ref 0–100)
LYMPHOCYTES # BLD: 5.4 K/UL (ref 0.8–3.5)
LYMPHOCYTES NFR BLD: 48 % (ref 12–49)
MCH RBC QN AUTO: 29.1 PG (ref 26–34)
MCHC RBC AUTO-ENTMCNC: 32 G/DL (ref 30–36.5)
MCV RBC AUTO: 90.7 FL (ref 80–99)
MONOCYTES # BLD: 0.3 K/UL (ref 0–1)
MONOCYTES NFR BLD: 3 % (ref 5–13)
NEUTS SEG # BLD: 5.2 K/UL (ref 1.8–8)
NEUTS SEG NFR BLD: 46 % (ref 32–75)
NRBC # BLD: 0 K/UL (ref 0–0.01)
NRBC BLD-RTO: 0 PER 100 WBC
PLATELET # BLD AUTO: 243 K/UL (ref 150–400)
PMV BLD AUTO: 12.6 FL (ref 8.9–12.9)
POTASSIUM SERPL-SCNC: 3.4 MMOL/L (ref 3.5–5.1)
PROT SERPL-MCNC: 7.2 G/DL (ref 6.4–8.2)
PSA SERPL-MCNC: 5.1 NG/ML (ref 0.01–4)
RBC # BLD AUTO: 4.54 M/UL (ref 4.1–5.7)
RBC MORPH BLD: ABNORMAL
SODIUM SERPL-SCNC: 142 MMOL/L (ref 136–145)
TRIGL SERPL-MCNC: 170 MG/DL
VLDLC SERPL CALC-MCNC: 34 MG/DL
WBC # BLD AUTO: 11.2 K/UL (ref 4.1–11.1)

## 2024-07-29 ENCOUNTER — TELEPHONE (OUTPATIENT)
Facility: CLINIC | Age: 64
End: 2024-07-29

## 2024-07-29 DIAGNOSIS — R97.20 ELEVATED PSA, LESS THAN 10 NG/ML: Primary | ICD-10-CM

## 2024-08-13 DIAGNOSIS — F07.81 POSTCONCUSSIONAL SYNDROME: ICD-10-CM

## 2024-08-13 RX ORDER — TIZANIDINE 4 MG/1
4 TABLET ORAL NIGHTLY PRN
Qty: 10 TABLET | Refills: 5 | Status: SHIPPED | OUTPATIENT
Start: 2024-08-13

## 2024-11-07 ENCOUNTER — TELEMEDICINE (OUTPATIENT)
Facility: CLINIC | Age: 64
End: 2024-11-07
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
## 2024-11-07 ASSESSMENT — ANXIETY QUESTIONNAIRES
1. FEELING NERVOUS, ANXIOUS, OR ON EDGE: NOT AT ALL
5. BEING SO RESTLESS THAT IT IS HARD TO SIT STILL: NOT AT ALL
7. FEELING AFRAID AS IF SOMETHING AWFUL MIGHT HAPPEN: NOT AT ALL
4. TROUBLE RELAXING: NOT AT ALL
IF YOU CHECKED OFF ANY PROBLEMS ON THIS QUESTIONNAIRE, HOW DIFFICULT HAVE THESE PROBLEMS MADE IT FOR YOU TO DO YOUR WORK, TAKE CARE OF THINGS AT HOME, OR GET ALONG WITH OTHER PEOPLE: NOT DIFFICULT AT ALL
2. NOT BEING ABLE TO STOP OR CONTROL WORRYING: NOT AT ALL
6. BECOMING EASILY ANNOYED OR IRRITABLE: NOT AT ALL
GAD7 TOTAL SCORE: 0
3. WORRYING TOO MUCH ABOUT DIFFERENT THINGS: NOT AT ALL

## 2024-11-07 ASSESSMENT — PATIENT HEALTH QUESTIONNAIRE - PHQ9
SUM OF ALL RESPONSES TO PHQ QUESTIONS 1-9: 0
SUM OF ALL RESPONSES TO PHQ9 QUESTIONS 1 & 2: 0
SUM OF ALL RESPONSES TO PHQ QUESTIONS 1-9: 0
2. FEELING DOWN, DEPRESSED OR HOPELESS: NOT AT ALL
1. LITTLE INTEREST OR PLEASURE IN DOING THINGS: NOT AT ALL
SUM OF ALL RESPONSES TO PHQ QUESTIONS 1-9: 0
SUM OF ALL RESPONSES TO PHQ QUESTIONS 1-9: 0
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
## 2024-11-08 ENCOUNTER — TELEPHONE (OUTPATIENT)
Facility: CLINIC | Age: 64
End: 2024-11-08
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
## 2024-11-11 DIAGNOSIS — M25.569 ACUTE KNEE PAIN, UNSPECIFIED LATERALITY: ICD-10-CM

## 2024-11-14 ENCOUNTER — OFFICE VISIT (OUTPATIENT)
Age: 64
End: 2024-11-14
Payer: MEDICARE

## 2024-11-14 VITALS
SYSTOLIC BLOOD PRESSURE: 138 MMHG | DIASTOLIC BLOOD PRESSURE: 86 MMHG | OXYGEN SATURATION: 97 % | WEIGHT: 215.8 LBS | BODY MASS INDEX: 30.21 KG/M2 | HEIGHT: 71 IN | HEART RATE: 89 BPM | RESPIRATION RATE: 18 BRPM

## 2024-11-14 DIAGNOSIS — H53.2 DOUBLE VISION: ICD-10-CM

## 2024-11-14 DIAGNOSIS — F07.81 POSTCONCUSSIONAL SYNDROME: Primary | ICD-10-CM

## 2024-11-14 DIAGNOSIS — H53.8 BLURRY VISION: ICD-10-CM

## 2024-11-14 DIAGNOSIS — M54.2 CERVICALGIA: ICD-10-CM

## 2024-11-14 DIAGNOSIS — G44.319 ACUTE POST-TRAUMATIC HEADACHE, NOT INTRACTABLE: ICD-10-CM

## 2024-11-14 PROCEDURE — 3017F COLORECTAL CA SCREEN DOC REV: CPT | Performed by: NURSE PRACTITIONER

## 2024-11-14 PROCEDURE — 1123F ACP DISCUSS/DSCN MKR DOCD: CPT | Performed by: NURSE PRACTITIONER

## 2024-11-14 PROCEDURE — G8417 CALC BMI ABV UP PARAM F/U: HCPCS | Performed by: NURSE PRACTITIONER

## 2024-11-14 PROCEDURE — 1036F TOBACCO NON-USER: CPT | Performed by: NURSE PRACTITIONER

## 2024-11-14 PROCEDURE — 3079F DIAST BP 80-89 MM HG: CPT | Performed by: NURSE PRACTITIONER

## 2024-11-14 PROCEDURE — G8484 FLU IMMUNIZE NO ADMIN: HCPCS | Performed by: NURSE PRACTITIONER

## 2024-11-14 PROCEDURE — G8427 DOCREV CUR MEDS BY ELIG CLIN: HCPCS | Performed by: NURSE PRACTITIONER

## 2024-11-14 PROCEDURE — 3075F SYST BP GE 130 - 139MM HG: CPT | Performed by: NURSE PRACTITIONER

## 2024-11-14 PROCEDURE — 99214 OFFICE O/P EST MOD 30 MIN: CPT | Performed by: NURSE PRACTITIONER

## 2024-11-14 RX ORDER — METHYLPREDNISOLONE 4 MG/1
TABLET ORAL
Qty: 1 KIT | Refills: 0 | Status: SHIPPED | OUTPATIENT
Start: 2024-11-14 | End: 2024-11-20

## 2024-11-14 RX ORDER — BUTALBITAL, ACETAMINOPHEN AND CAFFEINE 50; 325; 40 MG/1; MG/1; MG/1
1 TABLET ORAL EVERY 6 HOURS PRN
Qty: 20 TABLET | Refills: 2 | Status: SHIPPED | OUTPATIENT
Start: 2024-11-14

## 2024-11-14 RX ORDER — NAPROXEN 500 MG/1
500 TABLET ORAL 2 TIMES DAILY
COMMUNITY
Start: 2024-11-07

## 2024-11-14 ASSESSMENT — PATIENT HEALTH QUESTIONNAIRE - PHQ9
SUM OF ALL RESPONSES TO PHQ QUESTIONS 1-9: 0
1. LITTLE INTEREST OR PLEASURE IN DOING THINGS: NOT AT ALL
SUM OF ALL RESPONSES TO PHQ QUESTIONS 1-9: 0
SUM OF ALL RESPONSES TO PHQ QUESTIONS 1-9: 0
2. FEELING DOWN, DEPRESSED OR HOPELESS: NOT AT ALL
SUM OF ALL RESPONSES TO PHQ QUESTIONS 1-9: 0
SUM OF ALL RESPONSES TO PHQ9 QUESTIONS 1 & 2: 0

## 2024-11-14 ASSESSMENT — ENCOUNTER SYMPTOMS
VOMITING: 0
NAUSEA: 1
BACK PAIN: 1
PHOTOPHOBIA: 1
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
## 2024-11-20 ENCOUNTER — HOSPITAL ENCOUNTER (OUTPATIENT)
Dept: PHYSICAL THERAPY | Facility: HOSPITAL | Age: 64
Setting detail: RECURRING SERIES
Discharge: HOME | End: 2024-11-23
Payer: MEDICARE

## 2024-11-20 PROCEDURE — 97162 PT EVAL MOD COMPLEX 30 MIN: CPT | Performed by: PHYSICAL THERAPIST

## 2024-11-20 PROCEDURE — 97535 SELF CARE MNGMENT TRAINING: CPT | Performed by: PHYSICAL THERAPIST
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
## 2024-11-23 DIAGNOSIS — M54.2 CERVICALGIA: ICD-10-CM

## 2024-11-23 DIAGNOSIS — F07.81 POSTCONCUSSIONAL SYNDROME: ICD-10-CM

## 2024-11-25 RX ORDER — METHYLPREDNISOLONE 4 MG/1
TABLET ORAL
OUTPATIENT
Start: 2024-11-25

## 2024-11-26 ENCOUNTER — HOSPITAL ENCOUNTER (OUTPATIENT)
Facility: HOSPITAL | Age: 64
Discharge: HOME | End: 2024-11-29

## 2024-11-26 ENCOUNTER — TELEPHONE (OUTPATIENT)
Age: 64
End: 2024-11-26

## 2024-11-26 DIAGNOSIS — F07.81 POSTCONCUSSIONAL SYNDROME: ICD-10-CM

## 2024-11-26 DIAGNOSIS — H53.8 BLURRY VISION: ICD-10-CM

## 2024-11-26 DIAGNOSIS — F07.81 POSTCONCUSSIONAL SYNDROME: Primary | ICD-10-CM

## 2024-11-26 DIAGNOSIS — H53.2 DOUBLE VISION: ICD-10-CM

## 2024-11-26 DIAGNOSIS — M54.2 CERVICALGIA: ICD-10-CM

## 2024-11-26 DIAGNOSIS — G44.319 ACUTE POST-TRAUMATIC HEADACHE, NOT INTRACTABLE: ICD-10-CM

## 2024-11-26 RX ORDER — LORAZEPAM 0.5 MG/1
TABLET ORAL
Qty: 2 TABLET | Refills: 0 | Status: SHIPPED | OUTPATIENT
Start: 2024-11-26 | End: 2024-12-24

## 2024-11-26 NOTE — TELEPHONE ENCOUNTER
HIPAA Verified (if caller is someone other than patient): no       Reason for Call:   Medication for MRI  Reason For Call:   During pts MRI procedure today he realized that he is claustophobic and would like to have a medication sent to [facility] to relax him and he will reschedule the appmt.          Message: (any additional details from patient/caller not covered above)  Pt can be reached at [phone] or [phone]

## 2024-12-02 ENCOUNTER — HOSPITAL ENCOUNTER (OUTPATIENT)
Dept: PHYSICAL THERAPY | Facility: HOSPITAL | Age: 64
Setting detail: RECURRING SERIES
Discharge: HOME | End: 2024-12-05
Payer: MEDICARE

## 2024-12-02 PROCEDURE — 97110 THERAPEUTIC EXERCISES: CPT

## 2024-12-02 NOTE — PROGRESS NOTES
PLOF and address remaining functional goals.       min [] Estim Unattended,             type/location:       []  w/ice    []  w/heat        min [] Estim Attended,             type/location:       []  w/ice   []  w/heat         []  w/US   []  TENS insruct            min []  Mechanical Traction,        type/lbs:        []  pro      []  sup           []  int       []  cont            []  before manual           []  after manual     min []  Ultrasound,         settings/location:     10 min  unbilled []  Ice     [x]  Heat            location/position:  supine/ neck and back         min []  Vasopneumatic Device,      press/temp:   pre-treatment girth :    post-treatment girth :    measured at (landmark       location) :   If using vaso (only need to measure limb vaso being performed on)        min []  Other:      Skin assessment post-treatment (if applicable):    [x]  intact    []  redness- no adverse reaction                 []redness - adverse reaction:          [x]  Patient Education billed concurrently with other procedures   [x] Review HEP    [] Progressed/Changed HEP, detail:    [] Other detail:         Other Objective/Functional Measures  --    Pain Level at end of session (0-10 scale): 7-8/10      Assessment   Introduced ROM exercises and HEP to reduce pain and increase functional movement. Educated on importance of movement within tolerance.  Patient will continue to benefit from skilled PT / OT services to modify and progress therapeutic interventions, analyze and address functional mobility deficits, analyze and address ROM deficits, analyze and address strength deficits, analyze and address soft tissue restrictions, analyze and cue for proper movement patterns, analyze and modify for postural abnormalities, analyze and address imbalance/dizziness, and instruct in home and community integration to address functional deficits and attain remaining goals.    Progress toward goals / Updated goals:  []  See Progress

## 2024-12-04 ENCOUNTER — APPOINTMENT (OUTPATIENT)
Dept: PHYSICAL THERAPY | Facility: HOSPITAL | Age: 64
End: 2024-12-04
Payer: MEDICARE

## 2024-12-06 ENCOUNTER — HOSPITAL ENCOUNTER (OUTPATIENT)
Dept: PHYSICAL THERAPY | Facility: HOSPITAL | Age: 64
Setting detail: RECURRING SERIES
Discharge: HOME | End: 2024-12-09
Payer: MEDICARE

## 2024-12-06 PROCEDURE — 97110 THERAPEUTIC EXERCISES: CPT

## 2024-12-06 PROCEDURE — 97112 NEUROMUSCULAR REEDUCATION: CPT

## 2024-12-06 PROCEDURE — 97530 THERAPEUTIC ACTIVITIES: CPT

## 2024-12-06 NOTE — PROGRESS NOTES
strength, coordination, balance, and proprioception in order to improve patient's ability to progress to PLOF and address remaining functional goals.  (see flow sheet as applicable)     Details if applicable:  bed mobility training to avoid stress on lumbar and cervical paraspinals.                   60 55    Total Total         Modalities Rationale:     decrease pain and increase tissue extensibility to improve patient's ability to progress to PLOF and address remaining functional goals.       min [] Estim Unattended,             type/location:       []  w/ice    []  w/heat        min [] Estim Attended,             type/location:       []  w/ice   []  w/heat         []  w/US   []  TENS insruct            min []  Mechanical Traction,        type/lbs:        []  pro      []  sup           []  int       []  cont            []  before manual           []  after manual     min []  Ultrasound,         settings/location:     10 min  unbilled []  Ice     [x]  Heat            location/position:  supine/ neck and back         min []  Vasopneumatic Device,      press/temp:   pre-treatment girth :    post-treatment girth :    measured at (landmark       location) :   If using vaso (only need to measure limb vaso being performed on)        min []  Other:      Skin assessment post-treatment (if applicable):    [x]  intact    []  redness- no adverse reaction                 []redness - adverse reaction:          [x]  Patient Education billed concurrently with other procedures   [x] Review HEP    [] Progressed/Changed HEP, detail:    [] Other detail:         Other Objective/Functional Measures  --    Pain Level at end of session (0-10 scale): 8/10      Assessment   Pt required extensive cues and education on proper muscle activation for pelvic tilts in maintaining neutral spine and avoiding compensation from other muscles. He did report feeling less pain with be mobility training   Patient will continue to benefit from skilled PT

## 2024-12-09 ENCOUNTER — HOSPITAL ENCOUNTER (OUTPATIENT)
Dept: PHYSICAL THERAPY | Facility: HOSPITAL | Age: 64
Setting detail: RECURRING SERIES
Discharge: HOME | End: 2024-12-12
Payer: MEDICARE

## 2024-12-09 PROCEDURE — 97110 THERAPEUTIC EXERCISES: CPT

## 2024-12-09 PROCEDURE — 97140 MANUAL THERAPY 1/> REGIONS: CPT

## 2024-12-10 NOTE — PROGRESS NOTES
PHYSICAL THERAPY - MEDICARE DAILY TREATMENT NOTE (updated 3/23)        Date: 2024            Patient Name:  Alex Gar :  1960   Medical   Diagnosis:  Postconcussional syndrome [F07.81]  Other visual disturbances [H53.8]  Acute post-traumatic headache, not intractable [G44.319]  Cervicalgia [M54.2] Treatment Diagnosis:  M25.562  LEFT KNEE PAIN , M54.2  NECK PAIN and M54.59  OTHER LOWER BACK PAIN , and S06.0X0A  Concussion without loss of consciousness, initial encounter    Referral Source:  Pamella Dickerson APRN - * Insurance:   Payor: MEDICARE / Plan: MEDICARE PART A AND B / Product Type: *No Product type* /                           Patient  verified yes     Visit #   Current  / Total 4 20   Time   In / Out 3:01pm 4:01pm   Total Treatment Time 60   Total Timed Codes 50   1:1 Treatment Time 50      [facility] BC Totals Reminder:  bill using total billable   min of TIMED therapeutic procedures and modalities.   8-22 min = 1 unit; 23-37 min = 2 units; 38-52 min = 3 units; 53-67 min = 4 units; 68-82 min = 5 units            SUBJECTIVE: Patient reports having intermittent pain in R shoulder/neck pending activities. Pt also reported some intermittent lower back pain as well. Pt states he has been trying to perform HEP when able pending pain levels.      Pain Level (0-10 scale): 7-8/10     Any medication changes, allergies to medications, adverse drug reactions, diagnosis change, or new procedure performed?: [x] No    [] Yes (see summary sheet for update)  Medications: Verified on Patient Summary List     OBJECTIVE  Palpation: pt w/ moderate restrictions and trigger points in upper trap (R>L) musculature     Therapeutic Procedures:  Tx Min Billable or 1:1 Min (if diff from Tx Min) Procedure, Rationale, Specifics   33  37184 Therapeutic Exercise (timed):  increase ROM, strength, coordination, balance, and proprioception to improve patient's ability to progress to PLOF and address remaining functional goals. (see

## 2024-12-11 ENCOUNTER — HOSPITAL ENCOUNTER (OUTPATIENT)
Dept: PHYSICAL THERAPY | Facility: HOSPITAL | Age: 64
Setting detail: RECURRING SERIES
Discharge: HOME | End: 2024-12-14
Payer: MEDICARE

## 2024-12-11 PROCEDURE — 97010 HOT OR COLD PACKS THERAPY: CPT

## 2024-12-11 PROCEDURE — 97140 MANUAL THERAPY 1/> REGIONS: CPT

## 2024-12-11 PROCEDURE — 97110 THERAPEUTIC EXERCISES: CPT

## 2024-12-11 NOTE — PROGRESS NOTES
Stabilization x2 while walking for >/= 60 seconds without symptoms  -ongoing  3) Perform Saccades >/= 60 seconds without symptoms  -ongoing  4) Perform VOR Cancellation while walking without reproduction of symptoms  -ongoing  5) Balance on firm surface with eyes closed >/= 60 seconds without symptoms  -ongoing       6) Balance on soft surface with eyes closed >/= 60 seconds without symptoms  -ongoing  7) Tolerate visual conflict while walking without symptoms  -ongoing  8) Tolerate return to physical activity protocol without symptoms  -ongoing  9) Pt will be able to read without increase of neck pain  -ongoing  10) Pt will be able to look over shoulders while driving without increase of neck pain  -ongoing  11) Pt will demonstrate ability to lift >/= 15 lbs without increase of symptoms  -ongoing  12) Pt will be able to sleep through the night without waking in pain  -ongoing  13) Pt will be able to rise from supine to sitting without head lag or pain.   -ongoing     Frequency / Duration: Patient to be seen 2 times per week for 10-20 treatments.        PLAN  Yes  Continue plan of care  Re-Cert Due: 17 visits  [x]  Upgrade activities as tolerated  []  Discharge due to:  []  Other:        Kamilla Waller, PT, DPT       12/11/2024

## 2024-12-16 ENCOUNTER — HOSPITAL ENCOUNTER (OUTPATIENT)
Dept: PHYSICAL THERAPY | Facility: HOSPITAL | Age: 64
Setting detail: RECURRING SERIES
Discharge: HOME | End: 2024-12-19
Payer: MEDICARE

## 2024-12-16 PROCEDURE — 97110 THERAPEUTIC EXERCISES: CPT | Performed by: PHYSICAL MEDICINE & REHABILITATION

## 2024-12-16 PROCEDURE — G0283 ELEC STIM OTHER THAN WOUND: HCPCS | Performed by: PHYSICAL MEDICINE & REHABILITATION

## 2024-12-16 PROCEDURE — 97140 MANUAL THERAPY 1/> REGIONS: CPT | Performed by: PHYSICAL MEDICINE & REHABILITATION

## 2024-12-16 NOTE — PROGRESS NOTES
of neck pain  10) Pt will be able to look over shoulders while driving without increase of neck pain  11) Pt will demonstrate ability to lift >/= 15 lbs without increase of symptoms  12) Pt will be able to sleep through the night without waking in pain  13) Pt will be able to rise from supine to sitting without head lag or pain.      Frequency / Duration: Patient to be seen 2 times per week for 10-20 treatments.      PLAN  Yes  Continue plan of care  Re-Cert Due: 20 visits  [x]  Upgrade activities as tolerated  []  Discharge due to:  []  Other:      DANIELE HOOD, PT       12/16/2024       7:58 AM

## 2024-12-19 ENCOUNTER — TELEPHONE (OUTPATIENT)
Age: 64
End: 2024-12-19

## 2024-12-19 NOTE — TELEPHONE ENCOUNTER
Patient would like a letter for his employer stating that he can be taken off light duty. Pt would like to pick the letter up today if possible he can be reached at [phone] or [phone]

## 2025-01-03 ENCOUNTER — HOSPITAL ENCOUNTER (OUTPATIENT)
Dept: PHYSICAL THERAPY | Facility: HOSPITAL | Age: 65
Setting detail: RECURRING SERIES
Discharge: HOME | End: 2025-01-06
Payer: MEDICARE

## 2025-01-03 PROCEDURE — 97140 MANUAL THERAPY 1/> REGIONS: CPT | Performed by: PHYSICAL MEDICINE & REHABILITATION

## 2025-01-03 PROCEDURE — 97110 THERAPEUTIC EXERCISES: CPT | Performed by: PHYSICAL MEDICINE & REHABILITATION

## 2025-01-03 PROCEDURE — G0283 ELEC STIM OTHER THAN WOUND: HCPCS | Performed by: PHYSICAL MEDICINE & REHABILITATION

## 2025-01-03 NOTE — PROGRESS NOTES
PHYSICAL THERAPY - MEDICARE DAILY TREATMENT NOTE (updated 3/23)      Date: 1/3/2025          Patient Name:  Alex Gar :  1960   Medical   Diagnosis:  Postconcussional syndrome [F07.81]  Other visual disturbances [H53.8]  Acute post-traumatic headache, not intractable [G44.319]  Cervicalgia [M54.2] Treatment Diagnosis:  M25.562  LEFT KNEE PAIN , M54.2  NECK PAIN and M54.59  OTHER LOWER BACK PAIN , and S06.0X0A  Concussion without loss of consciousness, initial encounter    Referral Source:  Pamella Dickerson APRN - * Insurance:   Payor: MEDICARE / Plan: MEDICARE PART A AND B / Product Type: *No Product type* /                     Patient  verified yes     Visit #   Current  / Total 7 20   Time   In / Out 11:45A 12:50P   Total Treatment Time 65   Total Timed Codes 55   1:1 Treatment Time 55      [facility] Totals Reminder:  bill using total billable   min of TIMED therapeutic procedures and modalities.   8-22 min = 1 unit; 23-37 min = 2 units; 38-52 min = 3 units; 53-67 min = 4 units; 68-82 min = 5 units            SUBJECTIVE    Pain Level (0-10 scale): 6    Any medication changes, allergies to medications, adverse drug reactions, diagnosis change, or new procedure performed?: [x] No    [] Yes (see summary sheet for update)  Medications: Verified on Patient Summary List    Subjective functional status/changes:     Pt reports he has been feeling a little bit better. Pain has been less frequent and less intense, but continued R-sided HA and limited left cervical rotation.    OBJECTIVE      Therapeutic Procedures:  Tx Min Billable or 1:1 Min (if diff from Tx Min) Procedure, Rationale, Specifics   40 40 [id number] Therapeutic Exercise (timed):  increase ROM, strength, coordination, balance, and proprioception to improve patient's ability to progress to PLOF and address remaining functional goals. (see flow sheet as applicable)     Details if applicable:         15 15 86815 Manual Therapy (timed):  decrease pain, 
                     50% mid thigh                       50% R QL tightness                Rotation                                 25% LBP                     25% LBP                           RECOMMENDATIONS FOR SKILLED THERAPY:  He would benefit from additional therapy to further address impairments and maximize function with decreased pain.        DANIELE HOOD, PT       1/3/2025       12:21 PM    If you have any questions/comments please contact us directly at [phone].   Thank you for allowing us to assist in the care of your patient.

## 2025-01-07 ENCOUNTER — APPOINTMENT (OUTPATIENT)
Dept: PHYSICAL THERAPY | Facility: HOSPITAL | Age: 65
End: 2025-01-07
Payer: MEDICARE

## 2025-01-14 ENCOUNTER — HOSPITAL ENCOUNTER (OUTPATIENT)
Dept: PHYSICAL THERAPY | Facility: HOSPITAL | Age: 65
Setting detail: RECURRING SERIES
Discharge: HOME | End: 2025-01-17
Payer: MEDICARE

## 2025-01-14 PROCEDURE — 97112 NEUROMUSCULAR REEDUCATION: CPT

## 2025-01-14 PROCEDURE — G0283 ELEC STIM OTHER THAN WOUND: HCPCS

## 2025-01-14 PROCEDURE — 97110 THERAPEUTIC EXERCISES: CPT

## 2025-01-14 PROCEDURE — 97140 MANUAL THERAPY 1/> REGIONS: CPT

## 2025-01-14 NOTE — PROGRESS NOTES
[]redness - adverse reaction:          [x]  Patient Education billed concurrently with other procedures   [x] Review HEP    [] Progressed/Changed HEP, detail:    [] Other detail:         Other Objective/Functional Measures  AROM cervical rot R 47 deg p! L 33 p! (Following manual and SNAGs)    Pain Level at end of session (0-10 scale): 0/10      Assessment   Pt challenged with added rows today and required cues for proper performance. Pt able to correctly engage scapula and avoid UT compensation with cuing. Added SNAGs for cervical rotation to HEP. Noted improvement in muscle turgor during manual today. Pt may benefit from cervical upslides and downslides next session  Patient will continue to benefit from skilled PT / OT services to modify and progress therapeutic interventions, analyze and address functional mobility deficits, analyze and address ROM deficits, analyze and address strength deficits, analyze and address soft tissue restrictions, analyze and cue for proper movement patterns, analyze and modify for postural abnormalities, analyze and address imbalance/dizziness, and instruct in home and community integration to address functional deficits and attain remaining goals.    Progress toward goals / Updated goals:  []  See Progress Note/Recertification    Short Term Goals: To be accomplished in 4-6 treatments.  1)  Independent with HEP  2) Perform Gaze Stabilization at 1 Hz in sitting for >/= 30 seconds before onset of symptoms  3) Perform Saccades >/= 30 seconds before onset of symptoms  4) Perform VOR Cancellation in sitting for >/=30 seconds before onset of symptoms  5) Balance on firm surface with eyes closed >/= 45 seconds before onset of symptoms   6) Pt will be able to sit with upright posture >/= 5 minutes without increase of symptoms  7) Pt will report improvement in cervical Rotation to look over shoulders while driving.      Long Term Goals: To be accomplished in 10-20 treatments.  1)

## 2025-01-16 ENCOUNTER — HOSPITAL ENCOUNTER (OUTPATIENT)
Dept: PHYSICAL THERAPY | Facility: HOSPITAL | Age: 65
Setting detail: RECURRING SERIES
Discharge: HOME | End: 2025-01-19
Payer: MEDICARE

## 2025-01-16 PROCEDURE — G0283 ELEC STIM OTHER THAN WOUND: HCPCS

## 2025-01-16 PROCEDURE — 97110 THERAPEUTIC EXERCISES: CPT

## 2025-01-16 PROCEDURE — 97140 MANUAL THERAPY 1/> REGIONS: CPT

## 2025-01-16 NOTE — PROGRESS NOTES
PHYSICAL THERAPY - MEDICARE DAILY TREATMENT NOTE (updated 3/23)      Date: 2025          Patient Name:  Alex Gar :  1960   Medical   Diagnosis:  Postconcussional syndrome [F07.81]  Other visual disturbances [H53.8]  Acute post-traumatic headache, not intractable [G44.319]  Cervicalgia [M54.2] Treatment Diagnosis:  M25.562  LEFT KNEE PAIN , M54.2  NECK PAIN and M54.59  OTHER LOWER BACK PAIN , and S06.0X0A  Concussion without loss of consciousness, initial encounter    Referral Source:  Pamella Dickerson APRN - * Insurance:   Payor: MEDICARE / Plan: MEDICARE PART A AND B / Product Type: *No Product type* /                     Patient  verified yes     Visit #   Current  / Total 9 20   Time   In / Out 11:40am 12:45pm   Total Treatment Time 65 mins   Total Timed Codes 50 mins    1:1 Treatment Time 50 mins       [facility] Totals Reminder:  bill using total billable   min of TIMED therapeutic procedures and modalities.   8-22 min = 1 unit; 23-37 min = 2 units; 38-52 min = 3 units; 53-67 min = 4 units; 68-82 min = 5 units            SUBJECTIVE    Pain Level (0-10 scale): 6/10    Any medication changes, allergies to medications, adverse drug reactions, diagnosis change, or new procedure performed?: [x] No    [] Yes (see summary sheet for update)  Medications: Verified on Patient Summary List    Subjective functional status/changes:     Pt reports feeling okay today, reporting 6/10 pain level. Pt reports he is having more pain in neck, shoulder, and back. He states his mobility has improved since start of care, even though his pain is still present.    OBJECTIVE      Therapeutic Procedures:  Tx Min Billable or 1:1 Min (if diff from Tx Min) Procedure, Rationale, Specifics   35  54599 Therapeutic Exercise (timed):  increase ROM, strength, coordination, balance, and proprioception to improve patient's ability to progress to PLOF and address remaining functional goals. (see flow sheet as applicable)     Details

## 2025-01-21 ENCOUNTER — HOSPITAL ENCOUNTER (OUTPATIENT)
Dept: PHYSICAL THERAPY | Facility: HOSPITAL | Age: 65
Setting detail: RECURRING SERIES
Discharge: HOME | End: 2025-01-24
Payer: MEDICARE

## 2025-01-21 PROCEDURE — 97140 MANUAL THERAPY 1/> REGIONS: CPT

## 2025-01-21 PROCEDURE — G0283 ELEC STIM OTHER THAN WOUND: HCPCS

## 2025-01-21 PROCEDURE — 97110 THERAPEUTIC EXERCISES: CPT

## 2025-01-21 NOTE — PROGRESS NOTES
(post manual)    Pain Level at end of session (0-10 scale): 0/10      Assessment   Noted significant improvement in cervical rotation R following manual. Pt did need manual followed by  Patient will continue to benefit from skilled PT / OT services to modify and progress therapeutic interventions, analyze and address functional mobility deficits, analyze and address ROM deficits, analyze and address strength deficits, analyze and address soft tissue restrictions, analyze and cue for proper movement patterns, analyze and modify for postural abnormalities, analyze and address imbalance/dizziness, and instruct in home and community integration to address functional deficits and attain remaining goals.    Progress toward goals / Updated goals:  []  See Progress Note/Recertification    Short Term Goals: To be accomplished in 4-6 treatments.  1) Independent with HEP  2) Perform Gaze Stabilization at 1 Hz in sitting for >/= 30 seconds before onset of symptoms  3) Perform Saccades >/= 30 seconds before onset of symptoms  4) Perform VOR Cancellation in sitting for >/=30 seconds before onset of symptoms  5) Balance on firm surface with eyes closed >/= 45 seconds before onset of symptoms   6) Pt will be able to sit with upright posture >/= 5 minutes without increase of symptoms  7) Pt will report improvement in cervical Rotation to look over shoulders while driving.      Long Term Goals: To be accomplished in 10-20 treatments.  1) Perform Gaze Stabilization x1 while walking for >/= 60 seconds without symptoms  2) Perform Gaze Stabilization x2 while walking for >/= 60 seconds without symptoms  3) Perform Saccades >/= 60 seconds without symptoms  4) Perform VOR Cancellation while walking without reproduction of symptoms  5) Balance on firm surface with eyes closed >/= 60 seconds without symptoms       6) Balance on soft surface with eyes closed >/= 60 seconds without symptoms  7) Tolerate visual conflict while walking without

## 2025-01-23 ENCOUNTER — HOSPITAL ENCOUNTER (OUTPATIENT)
Dept: PHYSICAL THERAPY | Facility: HOSPITAL | Age: 65
Setting detail: RECURRING SERIES
Discharge: HOME | End: 2025-01-26
Payer: MEDICARE

## 2025-01-23 PROCEDURE — 97112 NEUROMUSCULAR REEDUCATION: CPT

## 2025-01-23 PROCEDURE — G0283 ELEC STIM OTHER THAN WOUND: HCPCS

## 2025-01-23 PROCEDURE — 97140 MANUAL THERAPY 1/> REGIONS: CPT

## 2025-01-23 NOTE — PROGRESS NOTES
PHYSICAL THERAPY - MEDICARE DAILY TREATMENT NOTE (updated 3/23)      Date: 2025          Patient Name:  Alex Gar :  1960   Medical   Diagnosis:  Postconcussional syndrome [F07.81]  Other visual disturbances [H53.8]  Acute post-traumatic headache, not intractable [G44.319]  Cervicalgia [M54.2] Treatment Diagnosis:  M25.562  LEFT KNEE PAIN , M54.2  NECK PAIN and M54.59  OTHER LOWER BACK PAIN , and S06.0X0A  Concussion without loss of consciousness, initial encounter    Referral Source:  Pamella Dickerson APRN - * Insurance:   Payor: MEDICARE / Plan: MEDICARE PART A AND B / Product Type: *No Product type* /                     Patient  verified yes     Visit #   Current  / Total 11 20   Time   In / Out 11:20am 12:20pm   Total Treatment Time 60 mins    Total Timed Codes 45 mins    1:1 Treatment Time 45 mins       [facility] Totals Reminder:  bill using total billable   min of TIMED therapeutic procedures and modalities.   8-22 min = 1 unit; 23-37 min = 2 units; 38-52 min = 3 units; 53-67 min = 4 units; 68-82 min = 5 units            SUBJECTIVE    Pain Level (0-10 scale): 6-7/10 (general pain)  6.5/10  (LB) and 6/10 (neck and shoulders)    Any medication changes, allergies to medications, adverse drug reactions, diagnosis change, or new procedure performed?: [x] No    [] Yes (see summary sheet for update)  Medications: Verified on Patient Summary List    Subjective functional status/changes:  discomfort in LB and has discomfort with cervical head turns to R and improved mobility but still has pain with L side, pt denies any dizziness today, but reports it was higher yesterday, pt also reports having headaches but not this morning      OBJECTIVE    VOR        Therapeutic Procedures:  Min Billable or 1:1 Min (if diff from Tx Min) Procedure, Rationale, Specifics        30  06914 Neuromuscular Re-Education (timed):  improve balance, coordination, kinesthetic sense, posture, core stability and proprioception to

## 2025-01-28 ENCOUNTER — HOSPITAL ENCOUNTER (OUTPATIENT)
Dept: PHYSICAL THERAPY | Facility: HOSPITAL | Age: 65
Setting detail: RECURRING SERIES
Discharge: HOME | End: 2025-01-31
Payer: MEDICARE

## 2025-01-28 ENCOUNTER — OFFICE VISIT (OUTPATIENT)
Facility: CLINIC | Age: 65
End: 2025-01-28
Payer: MEDICARE

## 2025-01-28 VITALS
WEIGHT: 214.6 LBS | BODY MASS INDEX: 30.04 KG/M2 | TEMPERATURE: 97.5 F | SYSTOLIC BLOOD PRESSURE: 144 MMHG | HEART RATE: 75 BPM | DIASTOLIC BLOOD PRESSURE: 89 MMHG | OXYGEN SATURATION: 98 % | RESPIRATION RATE: 18 BRPM | HEIGHT: 71 IN

## 2025-01-28 DIAGNOSIS — I10 PRIMARY HYPERTENSION: Primary | ICD-10-CM
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
## 2025-01-28 DIAGNOSIS — E78.5 HYPERLIPIDEMIA, UNSPECIFIED HYPERLIPIDEMIA TYPE: ICD-10-CM

## 2025-01-28 DIAGNOSIS — M34.9 SYSTEMIC SCLEROSIS (HCC): ICD-10-CM

## 2025-01-28 DIAGNOSIS — J84.9 INTERSTITIAL PULMONARY DISEASE, UNSPECIFIED (HCC): ICD-10-CM

## 2025-01-28 PROCEDURE — 1036F TOBACCO NON-USER: CPT | Performed by: INTERNAL MEDICINE

## 2025-01-28 PROCEDURE — 3079F DIAST BP 80-89 MM HG: CPT | Performed by: INTERNAL MEDICINE

## 2025-01-28 PROCEDURE — G0283 ELEC STIM OTHER THAN WOUND: HCPCS

## 2025-01-28 PROCEDURE — 1123F ACP DISCUSS/DSCN MKR DOCD: CPT | Performed by: INTERNAL MEDICINE

## 2025-01-28 PROCEDURE — 99214 OFFICE O/P EST MOD 30 MIN: CPT | Performed by: INTERNAL MEDICINE

## 2025-01-28 PROCEDURE — G8427 DOCREV CUR MEDS BY ELIG CLIN: HCPCS | Performed by: INTERNAL MEDICINE

## 2025-01-28 PROCEDURE — 97110 THERAPEUTIC EXERCISES: CPT

## 2025-01-28 PROCEDURE — 97530 THERAPEUTIC ACTIVITIES: CPT

## 2025-01-28 PROCEDURE — 3077F SYST BP >= 140 MM HG: CPT | Performed by: INTERNAL MEDICINE

## 2025-01-28 PROCEDURE — 97140 MANUAL THERAPY 1/> REGIONS: CPT

## 2025-01-28 PROCEDURE — G8417 CALC BMI ABV UP PARAM F/U: HCPCS | Performed by: INTERNAL MEDICINE

## 2025-01-28 PROCEDURE — 3017F COLORECTAL CA SCREEN DOC REV: CPT | Performed by: INTERNAL MEDICINE

## 2025-01-28 ASSESSMENT — PATIENT HEALTH QUESTIONNAIRE - PHQ9
2. FEELING DOWN, DEPRESSED OR HOPELESS: NOT AT ALL
SUM OF ALL RESPONSES TO PHQ QUESTIONS 1-9: 0
SUM OF ALL RESPONSES TO PHQ9 QUESTIONS 1 & 2: 0
SUM OF ALL RESPONSES TO PHQ QUESTIONS 1-9: 0
1. LITTLE INTEREST OR PLEASURE IN DOING THINGS: NOT AT ALL
SUM OF ALL RESPONSES TO PHQ QUESTIONS 1-9: 0
SUM OF ALL RESPONSES TO PHQ QUESTIONS 1-9: 0

## 2025-01-28 ASSESSMENT — ANXIETY QUESTIONNAIRES
IF YOU CHECKED OFF ANY PROBLEMS ON THIS QUESTIONNAIRE, HOW DIFFICULT HAVE THESE PROBLEMS MADE IT FOR YOU TO DO YOUR WORK, TAKE CARE OF THINGS AT HOME, OR GET ALONG WITH OTHER PEOPLE: NOT DIFFICULT AT ALL
6. BECOMING EASILY ANNOYED OR IRRITABLE: NOT AT ALL
1. FEELING NERVOUS, ANXIOUS, OR ON EDGE: NOT AT ALL
7. FEELING AFRAID AS IF SOMETHING AWFUL MIGHT HAPPEN: NOT AT ALL
3. WORRYING TOO MUCH ABOUT DIFFERENT THINGS: NOT AT ALL
GAD7 TOTAL SCORE: 0
4. TROUBLE RELAXING: NOT AT ALL
5. BEING SO RESTLESS THAT IT IS HARD TO SIT STILL: NOT AT ALL
2. NOT BEING ABLE TO STOP OR CONTROL WORRYING: NOT AT ALL
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
## 2025-01-28 NOTE — PROGRESS NOTES
PHYSICAL THERAPY - MEDICARE DAILY TREATMENT NOTE (updated 3/23)      Date: 2025          Patient Name:  Alex Gar :  1960   Medical   Diagnosis:  Postconcussional syndrome [F07.81]  Other visual disturbances [H53.8]  Acute post-traumatic headache, not intractable [G44.319]  Cervicalgia [M54.2] Treatment Diagnosis:  M25.562  LEFT KNEE PAIN , M54.2  NECK PAIN and M54.59  OTHER LOWER BACK PAIN , and S06.0X0A  Concussion without loss of consciousness, initial encounter    Referral Source:  Pamella Dickerson APRN - * Insurance:   Payor: MEDICARE / Plan: MEDICARE PART A AND B / Product Type: *No Product type* /                     Patient  verified yes     Visit #   Current  / Total 12 20   Time   In / Out 11:30A 12:53P   Total Treatment Time 83   Total Timed Codes 68   1:1 Treatment Time 60      [facility] Totals Reminder:  bill using total billable   min of TIMED therapeutic procedures and modalities.   8-22 min = 1 unit; 23-37 min = 2 units; 38-52 min = 3 units; 53-67 min = 4 units; 68-82 min = 5 units            SUBJECTIVE    Pain Level (0-10 scale): 6/10    Any medication changes, allergies to medications, adverse drug reactions, diagnosis change, or new procedure performed?: [x] No    [] Yes (see summary sheet for update)  Medications: Verified on Patient Summary List    Subjective functional status/changes:    Pt reported feeling bad this morning. He only got about 3 hours of sleep, had a death in the family, had an early Dr appt, his back and neck are still sore.       OBJECTIVE    Therapeutic Procedures:  Tx Min Billable or 1:1 Min (if diff from Tx Min) Procedure, Rationale, Specifics   28  36627 Therapeutic Exercise (timed):  increase ROM, strength, coordination, balance, and proprioception to improve patient's ability to progress to PLOF and address remaining functional goals. (see flow sheet as applicable)     Details if applicable:      48111 Therapeutic Activity (timed):  use of dynamic  DISPLAY PLAN FREE TEXT
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
## 2025-01-30 ENCOUNTER — HOSPITAL ENCOUNTER (OUTPATIENT)
Dept: PHYSICAL THERAPY | Facility: HOSPITAL | Age: 65
Setting detail: RECURRING SERIES
End: 2025-01-30
Payer: MEDICARE

## 2025-01-30 PROCEDURE — G0283 ELEC STIM OTHER THAN WOUND: HCPCS

## 2025-01-30 PROCEDURE — 97110 THERAPEUTIC EXERCISES: CPT

## 2025-01-30 PROCEDURE — 97140 MANUAL THERAPY 1/> REGIONS: CPT

## 2025-01-30 PROCEDURE — 97530 THERAPEUTIC ACTIVITIES: CPT

## 2025-01-30 NOTE — PROGRESS NOTES
Saccades >/= 30 seconds before onset of symptoms   MET  4) Perform VOR Cancellation in sitting for >/=30 seconds before onset of symptoms  MET  5) Balance on firm surface with eyes closed >/= 45 seconds before onset of symptoms  MET  6) Pt will be able to sit with upright posture >/= 5 minutes without increase of symptoms  MET  7) Pt will report improvement in cervical Rotation to look over shoulders while driving.  MET     Long Term Goals: To be accomplished in 10-20 treatments.  1) Perform Gaze Stabilization x1 while walking for >/= 60 seconds without symptoms  MET  2) Perform Gaze Stabilization x2 while walking for >/= 60 seconds without symptoms  Progressing (pt able to perform x1 while walking)  3) Perform Saccades >/= 60 seconds without symptoms  MET  4) Perform VOR Cancellation while walking without reproduction of symptoms Progressing (able to perform I sitting, will attempt w/ ambulation in upcoming sessions)  5) Balance on firm surface with eyes closed >/= 60 seconds without symptoms  MET  6) Balance on soft surface with eyes closed >/= 60 seconds without symptoms MET  7) Tolerate visual conflict while walking without symptoms  MET  8) Tolerate return to physical activity protocol without symptoms  Progressing   9) Pt will be able to read without increase of neck pain   MET  10) Pt will be able to look over shoulders while driving without increase of neck pain  MET  11) Pt will demonstrate ability to lift >/= 15 lbs without increase of symptoms  Progressing (will attempt lifting next session)  12) Pt will be able to sleep through the night without waking in pain - MET  13) Pt will be able to rise from supine to sitting without head lag or pain. - MET     Frequency / Duration: Patient to be seen 2 times per week for 10-20 treatments.      PLAN  Yes  Continue plan of care  Re-Cert Due: 9 visits  [x]  Upgrade activities as tolerated  []  Discharge due to:  []  Other:      Kamilla Waller, PT, DPT        1/30/2025       11:17 AM

## 2025-02-05 DIAGNOSIS — E78.5 HYPERLIPIDEMIA, UNSPECIFIED: ICD-10-CM

## 2025-02-05 RX ORDER — ATORVASTATIN CALCIUM 10 MG/1
10 TABLET, FILM COATED ORAL DAILY
Qty: 90 TABLET | Refills: 3 | Status: SHIPPED | OUTPATIENT
Start: 2025-02-05

## 2025-02-11 ENCOUNTER — TELEMEDICINE (OUTPATIENT)
Age: 65
End: 2025-02-11
Payer: MEDICARE

## 2025-02-11 ENCOUNTER — PATIENT MESSAGE (OUTPATIENT)
Age: 65
End: 2025-02-11

## 2025-02-11 DIAGNOSIS — R51.9 INTRACTABLE EPISODIC HEADACHE, UNSPECIFIED HEADACHE TYPE: ICD-10-CM

## 2025-02-11 DIAGNOSIS — F07.81 POSTCONCUSSIONAL SYNDROME: Primary | ICD-10-CM

## 2025-02-11 DIAGNOSIS — M54.50 CHRONIC LOW BACK PAIN, UNSPECIFIED BACK PAIN LATERALITY, UNSPECIFIED WHETHER SCIATICA PRESENT: ICD-10-CM

## 2025-02-11 DIAGNOSIS — M54.2 CERVICALGIA: ICD-10-CM

## 2025-02-11 DIAGNOSIS — G89.29 CHRONIC LOW BACK PAIN, UNSPECIFIED BACK PAIN LATERALITY, UNSPECIFIED WHETHER SCIATICA PRESENT: ICD-10-CM

## 2025-02-11 DIAGNOSIS — R42 DIZZINESS: ICD-10-CM

## 2025-02-11 PROCEDURE — 1123F ACP DISCUSS/DSCN MKR DOCD: CPT | Performed by: NURSE PRACTITIONER

## 2025-02-11 PROCEDURE — 99214 OFFICE O/P EST MOD 30 MIN: CPT | Performed by: NURSE PRACTITIONER

## 2025-02-11 RX ORDER — LORAZEPAM 0.5 MG/1
TABLET ORAL
Qty: 2 TABLET | Refills: 0 | Status: SHIPPED | OUTPATIENT
Start: 2025-02-11 | End: 2025-02-13

## 2025-02-11 RX ORDER — BUTALBITAL, ACETAMINOPHEN AND CAFFEINE 50; 325; 40 MG/1; MG/1; MG/1
1 TABLET ORAL EVERY 6 HOURS PRN
Qty: 20 TABLET | Refills: 2 | Status: SHIPPED | OUTPATIENT
Start: 2025-02-11

## 2025-02-11 ASSESSMENT — PATIENT HEALTH QUESTIONNAIRE - PHQ9
2. FEELING DOWN, DEPRESSED OR HOPELESS: NOT AT ALL
SUM OF ALL RESPONSES TO PHQ QUESTIONS 1-9: 0
SUM OF ALL RESPONSES TO PHQ9 QUESTIONS 1 & 2: 0
1. LITTLE INTEREST OR PLEASURE IN DOING THINGS: NOT AT ALL

## 2025-02-11 NOTE — PROGRESS NOTES
Chief Complaint   Patient presents with    Neurologic Problem     Sx have improved - still has some dizziness and headaches just not as bad      1. Have you been to the ER, urgent care clinic since your last visit?  Hospitalized since your last visit? No     2. Have you seen or consulted any other health care providers outside of the [facility] since your last visit?  Include any pap smears or colon screening. No     
intact.    Reflexes:  Unable to obtain via telemedicine    Cerebellar testing:  no tremor apparent, finger/nose and ophelia were intact    Romberg: absent    Gait: Not visualized.       Labs:     Lab Results   Component Value Date/Time     07/23/2024 08:17 AM    K 3.4 07/23/2024 08:17 AM     07/23/2024 08:17 AM    BUN 17 07/23/2024 08:17 AM    WBC 11.2 07/23/2024 08:17 AM    HCT 41.2 07/23/2024 08:17 AM    HGB 13.2 07/23/2024 08:17 AM     07/23/2024 08:17 AM    LDL 76 07/23/2024 08:17 AM    LDL 70.4 12/06/2023 10:03 AM       Imaging:  MRI Result (most recent):  No results found for this or any previous visit from the past 3650 days.              Assessment and Plan:   Diagnosis Orders   1. Postconcussional syndrome  LORazepam (ATIVAN) 0.5 MG tablet    BS - Physical Therapy at the [facility]    amitriptyline (ELAVIL) 25 MG tablet    tiZANidine (ZANAFLEX) 4 MG tablet      2. Cervicalgia  BS - Physical Therapy at the [facility]    XR CERVICAL SPINE (4 OR 5 VIEWS)      3. Chronic low back pain, unspecified back pain laterality, unspecified whether sciatica present  [facility] - Physical Therapy at the [facility]    XR LUMBAR SPINE (MIN 4 VIEWS)      4. Intractable episodic headache, unspecified headache type        5. Dizziness        6. Acute post-traumatic headache, not intractable  butalbital-acetaminophen-caffeine (FIORICET, ESGIC) -40 MG per tablet             Assessment & Plan  1. Post-concussion syndrome.  He continues to experience headaches, which have improved in intensity but persist periodically. He reports improvement in double vision and occasional dizziness. No substantial nausea is noted. He is currently taking amitriptyline 25 mg at bedtime and tizanidine as needed for muscle relaxation. Fioricet is used for severe headaches. A prescription for Ativan will be provided to facilitate a brain MRI, which he

## 2025-02-13 ENCOUNTER — TELEPHONE (OUTPATIENT)
Age: 65
End: 2025-02-13

## 2025-02-13 NOTE — TELEPHONE ENCOUNTER
HIPAA Verified (if caller is someone other than patient): no       Reason for Call:     Letter to return to work     Details from Caller:  Patients employer would like to have a written letter stating that he is able to return back to work and is cleared of any restrictions since 12/20. Pt would like to  the letter today along with the copy of the MRI order.      Message: (any additional details from patient/caller not covered above)  Pt can be reached at [phone] or [phone]

## 2025-02-18 ENCOUNTER — TELEPHONE (OUTPATIENT)
Age: 65
End: 2025-02-18

## 2025-02-18 DIAGNOSIS — R42 DIZZINESS: Primary | ICD-10-CM

## 2025-02-18 DIAGNOSIS — R51.9 INTRACTABLE EPISODIC HEADACHE, UNSPECIFIED HEADACHE TYPE: ICD-10-CM

## 2025-02-18 DIAGNOSIS — H53.8 BLURRY VISION: ICD-10-CM

## 2025-02-18 DIAGNOSIS — H53.2 DOUBLE VISION: ICD-10-CM

## 2025-02-18 NOTE — TELEPHONE ENCOUNTER
Yvette w/ [employer] states she needs us to put in a new order for MRI Brain W WO contrast as on their end it states that it was completed and won't allow them to schedule (confirmed Pt had started MRI but could not complete due to claustrophobia). Please advise.

## 2025-02-24 ENCOUNTER — HOSPITAL ENCOUNTER (OUTPATIENT)
Facility: HOSPITAL | Age: 65
Discharge: HOME | End: 2025-02-27
Payer: MEDICARE

## 2025-02-24 DIAGNOSIS — M54.2 CERVICALGIA: ICD-10-CM

## 2025-02-24 DIAGNOSIS — G89.29 CHRONIC LOW BACK PAIN, UNSPECIFIED BACK PAIN LATERALITY, UNSPECIFIED WHETHER SCIATICA PRESENT: ICD-10-CM

## 2025-02-24 DIAGNOSIS — M54.50 CHRONIC LOW BACK PAIN, UNSPECIFIED BACK PAIN LATERALITY, UNSPECIFIED WHETHER SCIATICA PRESENT: ICD-10-CM

## 2025-02-24 PROCEDURE — 72050 X-RAY EXAM NECK SPINE 4/5VWS: CPT

## 2025-02-24 PROCEDURE — 72100 X-RAY EXAM L-S SPINE 2/3 VWS: CPT

## 2025-03-11 ENCOUNTER — TELEPHONE (OUTPATIENT)
Age: 65
End: 2025-03-11

## 2025-03-11 NOTE — TELEPHONE ENCOUNTER
Francisca with [employer] [facility]  office calling to speak with .    She states she's already been in touch with her, but now they are ready to setup a phone conference with  and MARTA Dickerson.    They want to setup a date so they can get Pamella Menon deposition. Please call Francisca back at  and if unable to reach her at that number, you can call her at their main office phone number at . Thank you.

## 2025-03-14 NOTE — TELEPHONE ENCOUNTER
Patient called asking if we could write a letter stating that he called us on 2/13/25 to ask for a letter for his employer and came to pick it up on 2/14/25. His employer is requesting this documentation. Please advise.  [phone]

## 2025-03-25 ENCOUNTER — TELEPHONE (OUTPATIENT)
Age: 65
End: 2025-03-25

## 2025-03-25 NOTE — TELEPHONE ENCOUNTER
Patient calling because he is asking for some documentation from Pamella stating he is okay to drive for two years even with the medication he is currently on.    He states that he works for [employer]. He is saying, that he is being told by them \"because of his medication he's on for anxiety, they can only clear him for one year rather than two.\"    He said it's not a rush, but he can come to pick it up from the office when it's ready. He will call back next week for an update if it's ready.     Please call him back at .   Thank you.

## 2025-04-01 ENCOUNTER — TELEPHONE (OUTPATIENT)
Age: 65
End: 2025-04-01

## 2025-04-01 NOTE — TELEPHONE ENCOUNTER
RE:Butalbital -40 mg tablet prior authorization request sent to [employer] via [facility]    (Key: [id number])     PA Case: [id number]    Status: Approved    Coverage Starts on: 1/7/2025     Coverage Ends on: 4/8/2026     Effective Date: 1/7/2025    Authorization Expiration Date: 4/8/2026    Approval letter uploaded to media     FYI to nurse

## 2025-04-04 ENCOUNTER — TELEPHONE (OUTPATIENT)
Age: 65
End: 2025-04-04

## 2025-04-07 ENCOUNTER — TELEPHONE (OUTPATIENT)
Age: 65
End: 2025-04-07

## 2025-04-07 DIAGNOSIS — R51.9 INTRACTABLE EPISODIC HEADACHE, UNSPECIFIED HEADACHE TYPE: Primary | ICD-10-CM

## 2025-04-07 RX ORDER — BUTALBITAL, ACETAMINOPHEN AND CAFFEINE 50; 325; 40 MG/1; MG/1; MG/1
1 TABLET ORAL EVERY 6 HOURS PRN
Qty: 20 TABLET | Refills: 2 | Status: SHIPPED
Start: 2025-04-07

## 2025-05-15 DIAGNOSIS — I10 ESSENTIAL (PRIMARY) HYPERTENSION: ICD-10-CM

## 2025-05-16 RX ORDER — AMLODIPINE BESYLATE 10 MG/1
TABLET ORAL
Qty: 90 TABLET | Refills: 3 | Status: SHIPPED | OUTPATIENT
Start: 2025-05-16

## 2025-06-24 ENCOUNTER — OFFICE VISIT (OUTPATIENT)
Facility: CLINIC | Age: 65
End: 2025-06-24
Payer: MEDICARE

## 2025-06-24 VITALS
SYSTOLIC BLOOD PRESSURE: 147 MMHG | OXYGEN SATURATION: 98 % | WEIGHT: 215.7 LBS | HEART RATE: 84 BPM | RESPIRATION RATE: 18 BRPM | TEMPERATURE: 97.9 F | HEIGHT: 71 IN | BODY MASS INDEX: 30.2 KG/M2 | DIASTOLIC BLOOD PRESSURE: 91 MMHG

## 2025-06-24 DIAGNOSIS — I10 PRIMARY HYPERTENSION: ICD-10-CM

## 2025-06-24 DIAGNOSIS — M25.562 ACUTE PAIN OF LEFT KNEE: Primary | ICD-10-CM

## 2025-06-24 PROCEDURE — 3080F DIAST BP >= 90 MM HG: CPT | Performed by: INTERNAL MEDICINE

## 2025-06-24 PROCEDURE — 1123F ACP DISCUSS/DSCN MKR DOCD: CPT | Performed by: INTERNAL MEDICINE

## 2025-06-24 PROCEDURE — 99213 OFFICE O/P EST LOW 20 MIN: CPT | Performed by: INTERNAL MEDICINE

## 2025-06-24 PROCEDURE — 1125F AMNT PAIN NOTED PAIN PRSNT: CPT | Performed by: INTERNAL MEDICINE

## 2025-06-24 PROCEDURE — 3077F SYST BP >= 140 MM HG: CPT | Performed by: INTERNAL MEDICINE

## 2025-06-24 PROCEDURE — 1159F MED LIST DOCD IN RCRD: CPT | Performed by: INTERNAL MEDICINE

## 2025-06-24 RX ORDER — IBUPROFEN 600 MG/1
600 TABLET, FILM COATED ORAL 3 TIMES DAILY PRN
Qty: 30 TABLET | Refills: 1 | Status: SHIPPED | OUTPATIENT
Start: 2025-06-24

## 2025-06-24 SDOH — ECONOMIC STABILITY: FOOD INSECURITY: WITHIN THE PAST 12 MONTHS, THE FOOD YOU BOUGHT JUST DIDN'T LAST AND YOU DIDN'T HAVE MONEY TO GET MORE.: NEVER TRUE

## 2025-06-24 SDOH — ECONOMIC STABILITY: FOOD INSECURITY: WITHIN THE PAST 12 MONTHS, YOU WORRIED THAT YOUR FOOD WOULD RUN OUT BEFORE YOU GOT MONEY TO BUY MORE.: NEVER TRUE

## 2025-06-24 ASSESSMENT — PATIENT HEALTH QUESTIONNAIRE - PHQ9
SUM OF ALL RESPONSES TO PHQ QUESTIONS 1-9: 0
1. LITTLE INTEREST OR PLEASURE IN DOING THINGS: NOT AT ALL
SUM OF ALL RESPONSES TO PHQ QUESTIONS 1-9: 0
2. FEELING DOWN, DEPRESSED OR HOPELESS: NOT AT ALL

## 2025-06-24 NOTE — PROGRESS NOTES
Chief Complaint   Patient presents with    Knee Pain     Patient states he was on vacation in the gym and he heard like a popping sound in his left knee. Patient states his left knee is swollen and painful. Patient states his employer asked him to ask about FMLA.     Have you been to the ER, urgent care clinic since your last visit?  Hospitalized since your last visit?   NO    Have you seen or consulted any other health care providers outside our system since your last visit?   NO             HIPPA confirmed by two patient identifiers.

## 2025-06-24 NOTE — PROGRESS NOTES
Alex Gar is a 65 y.o. male and presents with   Chief Complaint   Patient presents with    Knee Pain     Patient states he was on vacation in the gym and he heard like a popping sound in his left knee. Patient states his left knee is swollen and painful. Patient states his employer asked him to ask about FMLA.       .  Subjective:    Pt was lifting weights and heard a \"snap\" in his left knee a few weeks ago. He has pain and instability. He is not taking anything    Pt completed his colonoscopy ([place]) and prostate bx.     PMH- HTN- on amlodipine 10mg     BP Readings from Last 3 Encounters:   25 (!) 147/91   25 (!) 144/89   24 138/86       Hyperlipidemia- on atorvastatin 10mg  Lab Results   Component Value Date    CHOL 165 2024    TRIG 170 (H) 2024    HDL 55 2024    VLDL 34 2024    CHOLHDLRATIO 3.0 2024      Lab Results   Component Value Date    ALT 22 2024    AST 10 (L) 2024    ALKPHOS 61 2024    BILITOT 1.1 (H) 2024       Elevated PSA  Lab Results   Component Value Date    PSA 5.1 (H) 2024       Chronic lbp/OA     ILD[facility]systemic sclerosis[facility]    -on cellcept     GERD- quiescent      PSH-none    SH- single   Self employed   No tob, occas alcohol, no illicit drugs   Lives alone no children    FH- father  ??   Mother  CAD, HTN   1 brother HTN    HM    Colonoscopy ~ 5 yrs ago in [place] nml as per pt  Immunizations declines flu vaccine  Eye care UTD  Dental care ~ 1 mth ago          Review of Systems  Constitutional: negative for fevers, chills, anorexia and weight loss  Eyes:   negative for visual disturbance and irritation  ENT:   negative for tinnitus,sore throat,nasal congestion,ear pains.hoarseness  Respiratory:  negative for cough, hemoptysis, dyspnea,wheezing  CV:   negative for chest pain, palpitations, lower extremity edema  GI:   negative for nausea, vomiting, diarrhea, abdominal pain,melena  Endo: